# Patient Record
Sex: FEMALE | Race: WHITE | Employment: FULL TIME | URBAN - METROPOLITAN AREA
[De-identification: names, ages, dates, MRNs, and addresses within clinical notes are randomized per-mention and may not be internally consistent; named-entity substitution may affect disease eponyms.]

---

## 2017-04-13 ENCOUNTER — HOSPITAL ENCOUNTER (OUTPATIENT)
Dept: MAMMOGRAPHY | Age: 42
Discharge: HOME | End: 2017-04-13
Payer: COMMERCIAL

## 2017-04-13 DIAGNOSIS — Z12.31 VISIT FOR SCREENING MAMMOGRAM: ICD-10-CM

## 2017-04-13 PROCEDURE — 77067 SCR MAMMO BI INCL CAD: CPT

## 2017-10-27 RX ORDER — ATORVASTATIN CALCIUM 10 MG/1
10 TABLET, FILM COATED ORAL DAILY
COMMUNITY
End: 2019-05-23 | Stop reason: SDUPTHER

## 2017-10-27 RX ORDER — ESTRADIOL 0.04 MG/D
1 FILM, EXTENDED RELEASE TRANSDERMAL
COMMUNITY
End: 2018-10-04 | Stop reason: DRUGHIGH

## 2017-10-27 RX ORDER — CHOLECALCIFEROL TAB 125 MCG (5000 UNIT) 125 MCG
5000 TAB ORAL DAILY
COMMUNITY

## 2017-10-27 RX ORDER — POLYETHYLENE GLYCOL 3350 17 G/17G
17 POWDER, FOR SOLUTION ORAL DAILY
Status: ON HOLD | COMMUNITY
End: 2017-10-30

## 2017-10-27 RX ORDER — VITAMIN E 1000 UNIT
1000 CAPSULE ORAL DAILY
COMMUNITY

## 2017-10-27 RX ORDER — AZELASTINE 1 MG/ML
1 SPRAY, METERED NASAL ONCE
COMMUNITY
End: 2020-04-06 | Stop reason: SDUPTHER

## 2017-10-30 ENCOUNTER — ANESTHESIA (OUTPATIENT)
Dept: ENDOSCOPY | Age: 42
End: 2017-10-30
Payer: COMMERCIAL

## 2017-10-30 ENCOUNTER — ANESTHESIA EVENT (OUTPATIENT)
Dept: ENDOSCOPY | Age: 42
End: 2017-10-30
Payer: COMMERCIAL

## 2017-10-30 ENCOUNTER — HOSPITAL ENCOUNTER (OUTPATIENT)
Age: 42
Setting detail: OUTPATIENT SURGERY
Discharge: HOME | End: 2017-10-30
Attending: INTERNAL MEDICINE | Admitting: INTERNAL MEDICINE
Payer: COMMERCIAL

## 2017-10-30 VITALS
HEART RATE: 83 BPM | RESPIRATION RATE: 14 BRPM | OXYGEN SATURATION: 100 % | TEMPERATURE: 98.2 F | HEIGHT: 61 IN | SYSTOLIC BLOOD PRESSURE: 107 MMHG | BODY MASS INDEX: 30.46 KG/M2 | DIASTOLIC BLOOD PRESSURE: 79 MMHG | WEIGHT: 161.31 LBS

## 2017-10-30 LAB — HCG UR QL: NEGATIVE

## 2017-10-30 PROCEDURE — 74011250636 HC RX REV CODE- 250/636

## 2017-10-30 PROCEDURE — 76040000019: Performed by: INTERNAL MEDICINE

## 2017-10-30 PROCEDURE — 81025 URINE PREGNANCY TEST: CPT

## 2017-10-30 PROCEDURE — 76060000031 HC ANESTHESIA FIRST 0.5 HR: Performed by: INTERNAL MEDICINE

## 2017-10-30 PROCEDURE — 74011000250 HC RX REV CODE- 250

## 2017-10-30 PROCEDURE — 74011250636 HC RX REV CODE- 250/636: Performed by: INTERNAL MEDICINE

## 2017-10-30 RX ORDER — POLYETHYLENE GLYCOL 3350 17 G/17G
17 POWDER, FOR SOLUTION ORAL 2 TIMES DAILY
Qty: 1 EACH | Refills: 3 | Status: SHIPPED | OUTPATIENT
Start: 2017-10-30 | End: 2022-04-04

## 2017-10-30 RX ORDER — FLUMAZENIL 0.1 MG/ML
0.2 INJECTION INTRAVENOUS
Status: DISCONTINUED | OUTPATIENT
Start: 2017-10-30 | End: 2017-10-30 | Stop reason: HOSPADM

## 2017-10-30 RX ORDER — DEXTROMETHORPHAN/PSEUDOEPHED 2.5-7.5/.8
1.2 DROPS ORAL
Status: DISCONTINUED | OUTPATIENT
Start: 2017-10-30 | End: 2017-10-30 | Stop reason: HOSPADM

## 2017-10-30 RX ORDER — SODIUM CHLORIDE 9 MG/ML
25 INJECTION, SOLUTION INTRAVENOUS CONTINUOUS
Status: DISCONTINUED | OUTPATIENT
Start: 2017-10-30 | End: 2017-10-30 | Stop reason: HOSPADM

## 2017-10-30 RX ORDER — SODIUM CHLORIDE 0.9 % (FLUSH) 0.9 %
5-10 SYRINGE (ML) INJECTION AS NEEDED
Status: DISCONTINUED | OUTPATIENT
Start: 2017-10-30 | End: 2017-10-30 | Stop reason: HOSPADM

## 2017-10-30 RX ORDER — SODIUM CHLORIDE 0.9 % (FLUSH) 0.9 %
5-10 SYRINGE (ML) INJECTION EVERY 8 HOURS
Status: DISCONTINUED | OUTPATIENT
Start: 2017-10-30 | End: 2017-10-30 | Stop reason: HOSPADM

## 2017-10-30 RX ORDER — PROPOFOL 10 MG/ML
INJECTION, EMULSION INTRAVENOUS AS NEEDED
Status: DISCONTINUED | OUTPATIENT
Start: 2017-10-30 | End: 2017-10-30 | Stop reason: HOSPADM

## 2017-10-30 RX ORDER — NALOXONE HYDROCHLORIDE 0.4 MG/ML
0.4 INJECTION, SOLUTION INTRAMUSCULAR; INTRAVENOUS; SUBCUTANEOUS
Status: DISCONTINUED | OUTPATIENT
Start: 2017-10-30 | End: 2017-10-30 | Stop reason: HOSPADM

## 2017-10-30 RX ORDER — LIDOCAINE HYDROCHLORIDE 20 MG/ML
INJECTION, SOLUTION EPIDURAL; INFILTRATION; INTRACAUDAL; PERINEURAL AS NEEDED
Status: DISCONTINUED | OUTPATIENT
Start: 2017-10-30 | End: 2017-10-30 | Stop reason: HOSPADM

## 2017-10-30 RX ORDER — EPINEPHRINE 0.1 MG/ML
1 INJECTION INTRACARDIAC; INTRAVENOUS
Status: DISCONTINUED | OUTPATIENT
Start: 2017-10-30 | End: 2017-10-30 | Stop reason: HOSPADM

## 2017-10-30 RX ORDER — ATROPINE SULFATE 0.1 MG/ML
0.5 INJECTION INTRAVENOUS
Status: DISCONTINUED | OUTPATIENT
Start: 2017-10-30 | End: 2017-10-30 | Stop reason: HOSPADM

## 2017-10-30 RX ADMIN — SODIUM CHLORIDE 25 ML/HR: 900 INJECTION, SOLUTION INTRAVENOUS at 10:47

## 2017-10-30 RX ADMIN — LIDOCAINE HYDROCHLORIDE 20 MG: 20 INJECTION, SOLUTION EPIDURAL; INFILTRATION; INTRACAUDAL; PERINEURAL at 11:38

## 2017-10-30 RX ADMIN — PROPOFOL 300 MG: 10 INJECTION, EMULSION INTRAVENOUS at 11:48

## 2017-10-30 NOTE — ANESTHESIA PREPROCEDURE EVALUATION
Anesthetic History   No history of anesthetic complications            Review of Systems / Medical History  Patient summary reviewed, nursing notes reviewed and pertinent labs reviewed    Pulmonary            Asthma        Neuro/Psych   Within defined limits           Cardiovascular    Hypertension              Exercise tolerance: >4 METS     GI/Hepatic/Renal  Within defined limits              Endo/Other  Within defined limits           Other Findings            Physical Exam    Airway  Mallampati: II  TM Distance: 4 - 6 cm  Neck ROM: normal range of motion   Mouth opening: Normal     Cardiovascular  Regular rate and rhythm,  S1 and S2 normal,  no murmur, click, rub, or gallop             Dental  No notable dental hx       Pulmonary  Breath sounds clear to auscultation               Abdominal  GI exam deferred       Other Findings            Anesthetic Plan    ASA: 2  Anesthesia type: MAC            Anesthetic plan and risks discussed with: Patient      preg negative.

## 2017-10-30 NOTE — H&P
Gastroenterology Outpatient History and Physical    Patient: Vinh Ricardo    Physician: Ellis Packer MD    Chief Complaint: change in bowel habits    History of Present Illness: 41 yo F with abnormal bowel habits, recent changes, now very constipated, suddenly. No prior colonoscopy. History:  Past Medical History:   Diagnosis Date    Asthma     High cholesterol     Hypertension     MRSA (methicillin resistant staph aureus) culture positive       Past Surgical History:   Procedure Laterality Date    HX HEENT      wisdom teeth      Social History     Social History    Marital status:      Spouse name: N/A    Number of children: N/A    Years of education: N/A     Social History Main Topics    Smoking status: Never Smoker    Smokeless tobacco: Never Used    Alcohol use Yes      Comment: occasional     Drug use: No    Sexual activity: Not Asked     Other Topics Concern    None     Social History Narrative      Family History   Problem Relation Age of Onset    Hypertension Mother     Cancer Father     Hypertension Father     Asthma Sister     There is no problem list on file for this patient. Allergies: Allergies   Allergen Reactions    Bactrim [Sulfamethoprim] Rash     Medications:   Prior to Admission medications    Medication Sig Start Date End Date Taking? Authorizing Provider   azelastine (ASTELIN) 137 mcg (0.1 %) nasal spray 1 Spray once. Use in each nostril as directed   Yes Historical Provider   atorvastatin (LIPITOR) 10 mg tablet Take 10 mg by mouth daily. Indications: prevention of cerebrovascular accident   Yes Historical Provider   cholecalciferol, VITAMIN D3, (VITAMIN D3) 5,000 unit tab tablet Take 5,000 Units by mouth daily. Yes Historical Provider   polyethylene glycol (MIRALAX) 17 gram packet Take 17 g by mouth daily. Yes Historical Provider   ascorbic acid, vitamin C, (VITAMIN C) 1,000 mg tablet Take 1,000 mg by mouth.    Yes Historical Provider   METHYLDOPA (ALDOMET PO) Take 250 mg by mouth two (2) times a day. Yes Historical Provider   estradiol (VIVELLE) 0.0375 mg/24 hr 1 Patch by TransDERmal route two (2) days a week. Yes Historical Provider   fluticasone (FLONASE) 50 mcg/Actuation nasal spray 2 Sprays by Both Nostrils route nightly. Yes Phys Other, MD   montelukast (SINGULAIR) 10 mg tablet Take 10 mg by mouth nightly. 12/17/10  Yes Historical Provider     Physical Exam:   Vital Signs: Blood pressure 139/73, pulse (!) 104, temperature 98.1 °F (36.7 °C), resp. rate 20, height 5' 1\" (1.549 m), weight 73.2 kg (161 lb 5 oz), SpO2 100 %.   General: well developed, well nourished   HEENT: unremarkable   Heart: regular rhythm no mumur    Lungs: clear   Abdominal:  benign   Neurological: unremarkable   Extremities: no edema     Findings/Diagnosis: change in bowel habits    Plan of Care/Planned Procedure: colonoscopy with conscious/deep sedation    Signed:  Ra Madrid MD 10/30/2017

## 2017-10-30 NOTE — IP AVS SNAPSHOT
[address] [facility] 
[phone] Patient: Kat Alberto MRN: [id number] :1975 About your hospitalization You were admitted on:  2017 You last received care in the:  [facility] ENDOSCOPY You were discharged on:  2017 Why you were hospitalized Your primary diagnosis was:  Not on File Things You Need To Do (next 8 weeks) Follow up with Silvio Rushing MD  
  
Phone:  [phone] Where:  [address] [address], [address] [address] Discharge Orders None A check filiberto indicates which time of day the medication should be taken. My Medications TAKE these medications as instructed Instructions Each Dose to Equal  
 Morning Noon Evening Bedtime ALDOMET PO Your last dose was: Your next dose is: Take 250 mg by mouth two (2) times a day. 250 mg  
    
   
   
   
  
 atorvastatin 10 mg tablet Commonly known as:  LIPITOR Your last dose was: Your next dose is: Take 10 mg by mouth daily. Indications: prevention of cerebrovascular accident 10 mg  
    
   
   
   
  
 azelastine 137 mcg (0.1 %) nasal spray Commonly known as:  ASTELIN Your last dose was: Your next dose is:    
   
   
 1 Spray once. Use in each nostril as directed 1 Spray  
    
   
   
   
  
 cholecalciferol (VITAMIN D3) 5,000 unit Tab tablet Commonly known as:  VITAMIN D3 Your last dose was: Your next dose is: Take 5,000 Units by mouth daily. 5000 Units  
    
   
   
   
  
 estradiol 0.0375 mg/24 hr  
Commonly known as:  Balbir John Your last dose was: Your next dose is:    
   
   
 1 Patch by TransDERmal route two (2) days a week. 1 Patch FLONASE 50 mcg/actuation nasal spray Generic drug:  fluticasone Your last dose was: Your next dose is: 2 Sprays by Both Nostrils route nightly. 2 Spray  
    
   
   
   
  
 polyethylene glycol 17 gram packet Commonly known as:  Duane Pollock Your last dose was: Your next dose is: Take 1 Packet by mouth two (2) times a day. 17 g SINGULAIR 10 mg tablet Generic drug:  montelukast  
   
Your last dose was: Your next dose is: Take 10 mg by mouth nightly. 10 mg  
    
   
   
   
  
 VITAMIN C 1,000 mg tablet Generic drug:  ascorbic acid (vitamin C) Your last dose was: Your next dose is: Take 1,000 mg by mouth. 1000 mg Where to Get Your Medications Information on where to get these meds will be given to you by the nurse or doctor. ! Ask your nurse or doctor about these medications  
  polyethylene glycol 17 gram packet Discharge Instructions Mariannonel Perezdonis 
[id number] 1975 COLON DISCHARGE INSTRUCTIONS Discomfort: 
Redness at IV site- apply warm compress to area; if redness or soreness persist- contact your physician There may be a slight amount of blood passed from the rectum Gaseous discomfort- walking, belching will help relieve any discomfort You may not operate a vehicle for 12 hours You may not engage in an occupation involving machinery or appliances for rest of today You may not drink alcoholic beverages for at least 12 hours Avoid making any critical decisions for at least 24 hour DIET: 
 High fiber diet.  however -  remember your colon is empty and a heavy meal will produce gas. Avoid these foods:  vegetables, fried / greasy foods, carbonated drinks for today MEDICATION: 
(See attached) ACTIVITY: 
You may not resume your normal daily activities until tomorrow AM; it is recommended that you spend the remainder of the day resting -  avoid any strenuous activity. CALL M.D. ANY SIGN OF: Increasing pain, nausea, vomiting Abdominal distension (swelling) New increased bleeding (oral or rectal) Fever (chills) IMPRESSION: 
-- no polyps! ! 
-- we saw some hemorrhoids, which are very common, and these are swollen veins at the anal canal or end of the rectum, which can bulge with constipation and straining or frequent bowel movements. Sometimes they cause bleeding, burning, pressure, or even pain. A diet high in fiber helps, but using a daily fiber supplement (like 1-2 capfuls of miralax) can help treat these. -- it was a little twisty in the lower colon, which can make it harder to have bowel movements Follow-up Instructions: 
 Call Dr. Hilarie Alpers for the results of procedure / biopsy in 7-10 days Telephone # [phone] Repeat colonoscopy in 5 years Flor Spear MD  
 
 
[employer] Activation Thank you for requesting access to [employer]. Please follow the instructions below to securely access and download your online medical record. [employer] allows you to send messages to your doctor, view your test results, renew your prescriptions, schedule appointments, and more. How Do I Sign Up? 1. In your internet browser, go to [email or web address] 
2. Click on the First Time User? Click Here link in the Sign In box. You will be redirect to the New Member Sign Up page. 3. Enter your [employer] Access Code exactly as it appears below. You will not need to use this code after youve completed the sign-up process. If you do not sign up before the expiration date, you must request a new code. [employer] Access Code: Activation code not generated Current [employer] Status: Active (This is the date your [employer] access code will ) 4. Enter the last four digits of your Social Security Number (xxxx) and Date of Birth (mm/dd/yyyy) as indicated and click Submit. You will be taken to the next sign-up page. 5. Create a [employer] ID. This will be your [employer] login ID and cannot be changed, so think of one that is secure and easy to remember. 6. Create a [employer] password. You can change your password at any time. 7. Enter your Password Reset Question and Answer. This can be used at a later time if you forget your password. 8. Enter your e-mail address. You will receive e-mail notification when new information is available in [address] 9. Click Sign Up. You can now view and download portions of your medical record. 10. Click the Download Summary menu link to download a portable copy of your medical information. Additional Information If you have questions, please visit the Frequently Asked Questions section of the [employer] website at [email or web address] Remember, [employer] is NOT to be used for urgent needs. For medical emergencies, dial 911. Introducing [facility] SERVICES! Dear Rafal Alvarado: Thank you for requesting a [employer] account. Our records indicate that you already have an active [employer] account. You can access your account anytime at [email or web address] Did you know that you can access your hospital and ER discharge instructions at any time in [employer]? You can also review all of your test results from your hospital stay or ER visit. Additional Information If you have questions, please visit the Frequently Asked Questions section of the [employer] website at [email or web address] Remember, [employer] is NOT to be used for urgent needs. For medical emergencies, dial 911. Now available from your iPhone and Android! Providers Seen During Your Hospitalization Provider Specialty Primary office phone Tyron Schafer MD Gastroenterology [phone] Your Primary Care Physician (PCP) Primary Care Physician Office Phone Office Fax Mich Paulson [phone] You are allergic to the following Allergen Reactions Bactrim (Sulfamethoprim) Rash Recent Documentation Height Weight BMI OB Status Smoking Status 1.549 m 73.2 kg 30.48 kg/m2 IUD Never Smoker Emergency Contacts Name Discharge Info Relation Home Work Mobile Octaviano Dickinson DISCHARGE CAREGIVER [3] Spouse [3] [phone] Patient Belongings The following personal items are in your possession at time of discharge: 
  Dental Appliances: None  Visual Aid: Glasses Please provide this summary of care documentation to your next provider. Signatures-by signing, you are acknowledging that this After Visit Summary has been reviewed with you and you have received a copy. Patient Signature:  ____________________________________________________________ Date:  ____________________________________________________________  
  
[facility] Provider Signature:  ____________________________________________________________ Date:  ____________________________________________________________

## 2017-10-30 NOTE — DISCHARGE INSTRUCTIONS
Delmis Colón  [id number]  1975    COLON DISCHARGE INSTRUCTIONS  Discomfort:  Redness at IV site- apply warm compress to area; if redness or soreness persist- contact your physician  There may be a slight amount of blood passed from the rectum  Gaseous discomfort- walking, belching will help relieve any discomfort  You may not operate a vehicle for 12 hours  You may not engage in an occupation involving machinery or appliances for rest of today  You may not drink alcoholic beverages for at least 12 hours  Avoid making any critical decisions for at least 24 hour  DIET:   High fiber diet. - however -  remember your colon is empty and a heavy meal will produce gas. Avoid these foods:  vegetables, fried / greasy foods, carbonated drinks for today  MEDICATION:  (See attached)     ACTIVITY:  You may not resume your normal daily activities until tomorrow AM; it is recommended that you spend the remainder of the day resting -  avoid any strenuous activity. CALL M.D. ANY SIGN OF:   Increasing pain, nausea, vomiting  Abdominal distension (swelling)  New increased bleeding (oral or rectal)  Fever (chills)    IMPRESSION:  -- no polyps! !  -- we saw some hemorrhoids, which are very common, and these are swollen veins at the anal canal or end of the rectum, which can bulge with constipation and straining or frequent bowel movements. Sometimes they cause bleeding, burning, pressure, or even pain. A diet high in fiber helps, but using a daily fiber supplement (like 1-2 capfuls of miralax) can help treat these. -- it was a little twisty in the lower colon, which can make it harder to have bowel movements    Follow-up Instructions:   Call Dr. Jono Enciso for the results of procedure / biopsy in 7-10 days  Telephone # [phone]  Repeat colonoscopy in 5 years    Winifred Cranker, MD [facility] Activation    Thank you for requesting access to [address]  Please follow the instructions below to securely access and download your online medical record. [employer] allows you to send messages to your doctor, view your test results, renew your prescriptions, schedule appointments, and more. How Do I Sign Up? 1. In your internet browser, go to [email or web address]  2. Click on the First Time User? Click Here link in the Sign In box. You will be redirect to the New Member Sign Up page. 3. Enter your [employer] Access Code exactly as it appears below. You will not need to use this code after youve completed the sign-up process. If you do not sign up before the expiration date, you must request a new code. [employer] Access Code: Activation code not generated  Current [employer] Status: Active (This is the date your [employer] access code will )    4. Enter the last four digits of your Social Security Number (xxxx) and Date of Birth (mm/dd/yyyy) as indicated and click Submit. You will be taken to the next sign-up page. 5. Create a [employer] ID. This will be your [employer] login ID and cannot be changed, so think of one that is secure and easy to remember. 6. Create a [employer] password. You can change your password at any time. 7. Enter your Password Reset Question and Answer. This can be used at a later time if you forget your password. 8. Enter your e-mail address. You will receive e-mail notification when new information is available in [address] 9. Click Sign Up. You can now view and download portions of your medical record. 10. Click the Download Summary menu link to download a portable copy of your medical information. Additional Information    If you have questions, please visit the Frequently Asked Questions section of the [employer] website at [email or web address] Remember, [employer] is NOT to be used for urgent needs. For medical emergencies, dial 911.

## 2017-10-30 NOTE — PERIOP NOTES
Caity Pod  1975  [id number]    Situation:  Verbal report received from: FAWAD Stephens RN  Procedure: Procedure(s):  COLONOSCOPY    Background:    Preoperative diagnosis: CHANGE IN BOWEL FUNCTION AND CHRONIC IDIOPATHIC CONSTIPATION  Postoperative diagnosis: Hemorrhoids    :  Dr. Anders Oar  Assistant(s): Endoscopy Technician-1: Matt Gramajo  Endoscopy RN-1: Jennifer Nieves RN    Specimens: * No specimens in log *  H. Pylori  no    Assessment:    Anesthesia gave intra-procedure sedation and medications, see anesthesia flow sheet yes    Intravenous fluids: NS@ KVO     Vital signs stable     Abdominal assessment: round and soft     Recommendation:  Discharge patient per MD order.   Family   Permission to share finding with family or friend yes

## 2017-10-30 NOTE — ANESTHESIA POSTPROCEDURE EVALUATION
Post-Anesthesia Evaluation and Assessment    Patient: Delmis Colón MRN: [id number]  SSN: [id number]    YOB: 1975  Age: 40 y.o. Sex: female       Cardiovascular Function/Vital Signs  Visit Vitals    /79    Pulse 83    Temp 36.8 °C (98.2 °F)    Resp 14    Ht 5' 1\" (1.549 m)    Wt 73.2 kg (161 lb 5 oz)    SpO2 100%    BMI 30.48 kg/m2       Patient is status post MAC anesthesia for Procedure(s):  COLONOSCOPY. Nausea/Vomiting: None    Postoperative hydration reviewed and adequate. Pain:  Pain Scale 1: Numeric (0 - 10) (10/30/17 1209)  Pain Intensity 1: 0 (10/30/17 1209)   Managed    Neurological Status: At baseline    Mental Status and Level of Consciousness: Arousable    Pulmonary Status:   O2 Device: Room air (10/30/17 1209)   Adequate oxygenation and airway patent    Complications related to anesthesia: None    Post-anesthesia assessment completed.  No concerns    Signed By: Reynaldo Lockwood MD     October 30, 2017

## 2017-10-30 NOTE — PERIOP NOTES
Anesthesia reports 300mg Propofol, 20mg Lidocaine and 250mL NS given during procedure. Received report from anesthesia staff on vital signs and status of patient.

## 2017-10-30 NOTE — PERIOP NOTES
Endoscope was pre-cleaned at the bedside immediately following procedure by [facility] AND [facility].

## 2017-10-30 NOTE — PROCEDURES
NAME:  Enid Whitman   :   1975   MRN:   [id number]     Date/Time:  10/30/2017 11:36 AM    Colonoscopy Operative Report    Procedure Type:  Colonoscopy --diagnostic     Indications: change in bowel habits  Pre-operative Diagnosis: see indication above  Post-operative Diagnosis:  See findings below  :  Roxi Lane MD  Referring Provider: -Ghada Sy MD    Exam:  Airway: clear, no airway problems anticipated  Heart: RRR, without gallops or rubs  Lungs: clear bilaterally without wheezes, crackles, or rhonchi  Abdomen: soft, nontender, nondistended, bowel sounds present  Mental Status: awake, alert and oriented to person, place and time    Sedation:  MAC anesthesia Propofol  Procedure Details:  After informed consent was obtained with all risks and benefits of procedure explained and preoperative exam completed, the patient was taken to the endoscopy suite and placed in the left lateral decubitus position. Upon sequential sedation as per above, a digital rectal exam was performed demonstrating internal and external hemorrhoids. The Olympus videocolonoscope  was inserted in the rectum and carefully advanced to the terminal ileum. The quality of preparation was good. The colonoscope was slowly withdrawn with careful evaluation between folds. Retroflexion in the rectum was completed demonstrating internal and external hemorrhoids. Findings:   ANUS: Anal exam reveals no masses but hemorrhoids, sphincter tone is normal.   RECTUM: Rectal exam reveals no masses but moderate internal and external hemorrhoids. SIGMOID COLON: The mucosa is normal with good vascular pattern and without ulcers, diverticula, and polyps. DESCENDING COLON: The mucosa is normal with good vascular pattern and without ulcers, diverticula, and polyps. TRANSVERSE COLON: The mucosa is normal with good vascular pattern and without ulcers, diverticula, and polyps.    ASCENDING COLON: The mucosa is normal with good vascular pattern and without ulcers, diverticula, and polyps. CECUM: The appendiceal orifice appears normal. The ileocecal valve appears normal.   TERMINAL ILEUM: The terminal ileum was entered and normal    Specimen Removed:  none  Complications:  None. EBL:     None. Impression:    -- internal and external hemorrhoids  -- otherwise, normal mucosa with some noted sigmoid tortuosity. Recommendations:   -- high fiber diet  -- repeat colonoscopy at age 49  -- follow up in the office in 3-4 weeks    Discharge Disposition:  Home in the company of a  when able to ambulate.       Tyron Schafer MD

## 2018-05-24 ENCOUNTER — HOSPITAL ENCOUNTER (OUTPATIENT)
Dept: MAMMOGRAPHY | Age: 43
Discharge: HOME | End: 2018-05-24
Payer: COMMERCIAL

## 2018-05-24 DIAGNOSIS — Z12.31 ENCOUNTER FOR SCREENING MAMMOGRAM FOR MALIGNANT NEOPLASM OF BREAST: ICD-10-CM

## 2018-05-24 PROCEDURE — 77067 SCR MAMMO BI INCL CAD: CPT

## 2018-09-23 ENCOUNTER — OFFICE VISIT (OUTPATIENT)
Dept: URGENT CARE | Age: 43
End: 2018-09-23

## 2018-09-23 VITALS
WEIGHT: 172 LBS | SYSTOLIC BLOOD PRESSURE: 134 MMHG | TEMPERATURE: 99.4 F | HEART RATE: 96 BPM | OXYGEN SATURATION: 99 % | BODY MASS INDEX: 33.77 KG/M2 | DIASTOLIC BLOOD PRESSURE: 75 MMHG | HEIGHT: 60 IN | RESPIRATION RATE: 16 BRPM

## 2018-09-23 DIAGNOSIS — B96.89 ACUTE BACTERIAL SINUSITIS: Primary | ICD-10-CM

## 2018-09-23 DIAGNOSIS — J01.90 ACUTE BACTERIAL SINUSITIS: Primary | ICD-10-CM

## 2018-09-23 DIAGNOSIS — H57.89 EYE DISCHARGE: ICD-10-CM

## 2018-09-23 RX ORDER — LOSARTAN POTASSIUM 50 MG/1
50 TABLET ORAL DAILY
COMMUNITY
End: 2019-05-23 | Stop reason: SDUPTHER

## 2018-09-23 RX ORDER — AMOXICILLIN AND CLAVULANATE POTASSIUM 875; 125 MG/1; MG/1
1 TABLET, FILM COATED ORAL EVERY 12 HOURS
Qty: 14 TAB | Refills: 0 | Status: SHIPPED | OUTPATIENT
Start: 2018-09-23 | End: 2018-09-30

## 2018-09-23 RX ORDER — LORATADINE 10 MG/1
10 TABLET ORAL
COMMUNITY
End: 2022-04-20 | Stop reason: ALTCHOICE

## 2018-09-23 NOTE — PROGRESS NOTES
HPI Comments:     2 day history of left eye discharge. Feels a little swollen and sore right in corner  Thinks may be a tear duct problem  Some occasional drainage from eye clear to cloudy. No redness, swelling, fever, or chills, nausea or vomiting. No pain with eye movement. Has been using warm compresses with minimal relief. Denies any other associated symptoms. Feels well otherwise. Just saw allergist last week. Hx significant for MRSA, high cholesterol, asthma, HTN    Patient is a 44 y.o. female presenting with eye pain. Eye Pain          Past Medical History:   Diagnosis Date    Asthma     High cholesterol     Hypertension     MRSA (methicillin resistant staph aureus) culture positive         Past Surgical History:   Procedure Laterality Date    COLONOSCOPY N/A 10/30/2017    COLONOSCOPY performed by Kaylie Sanches [facility] at [facility]  10/30/2017         HX HEENT      wisdom teeth         Family History   Problem Relation Age of Onset    Hypertension Mother     Cancer Father     Hypertension Father     Asthma Sister         Social History     Social History    Marital status: UNKNOWN     Spouse name: N/A    Number of children: N/A    Years of education: N/A     Occupational History    Not on file. Social History Main Topics    Smoking status: Never Smoker    Smokeless tobacco: Never Used    Alcohol use Yes      Comment: occasional     Drug use: No    Sexual activity: Not on file     Other Topics Concern    Not on file     Social History Narrative                ALLERGIES: Bactrim [sulfamethoprim]    Review of Systems   HENT: Positive for sinus pain. Eyes: Positive for pain. Negative for redness and visual disturbance. Neurological: Negative for dizziness and light-headedness. All other systems reviewed and are negative.       Vitals:    09/23/18 1759   BP: 134/75   Pulse: 96   Resp: 16   Temp: 99.4 °F (37.4 °C)   SpO2: 99%   Weight: 172 lb (78 kg)   Height: 5' (1.524 m)       Physical Exam   Constitutional: She is oriented to person, place, and time. No distress. Non-toxic appearing, well hydrated   HENT:   Head:       Swollen pale nasal turbinates with yellowish green mucus in nasal passages bilat. Maxillary sinus TTP. OP cobblestoning otherwise clear and moist. TMs normal.   Eyes: Conjunctivae and EOM are normal. Pupils are equal, round, and reactive to light. No scleral icterus. Sclera non injected. No periorbital erythema or swelling. No discharge. No pain with EOM movements. No pain to eye with closed eye palpation. No chemosis. Neck: Normal range of motion. Neck supple. Cardiovascular: Normal rate, regular rhythm and normal heart sounds. Exam reveals no gallop and no friction rub. No murmur heard. Pulmonary/Chest: Effort normal and breath sounds normal. No respiratory distress. She has no wheezes. She has no rales. Lymphadenopathy:     She has no cervical adenopathy. Neurological: She is alert and oriented to person, place, and time. No cranial nerve deficit. Skin: Skin is warm and dry. No rash noted. She is not diaphoretic. No erythema. No pallor. Psychiatric: She has a normal mood and affect. Her behavior is normal. Thought content normal.   Nursing note and vitals reviewed. MDM     Differential Diagnosis; Clinical Impression; Plan:       CLINICAL IMPRESSION:  (J01.90,  B96.89) Acute bacterial sinusitis  (primary encounter diagnosis)  (H57.8) Eye discharge    Orders Placed This Encounter  RX      amoxicillin-clavulanate (AUGMENTIN) 875-125 mg per tablet    Suspect and most likely bacterial sinus infection. DDX  preseptal cellulitis, dacrocystitis. Do not suspect orbital cellulitis. Does not appear to be conjunctivitis. Plan:  1. Will start on Augmentin  2. Warm moist compresses  3. Follow up with PCP in 4-5 days for re evaluation  4. Advised immediate follow up for new, worsening or changes.       We have reviewed concerning signs/symptoms, normal vs abnormal progression of medical condition and when to seek immediate medical attention.             Procedures

## 2018-09-23 NOTE — PATIENT INSTRUCTIONS
I am advising that you follow up with your Primary Care Doctor in 4-5 days for re evaluation; follow up sooner/immediately for new, worsening or changes      Sinusitis: Care Instructions  Your Care Instructions    Sinusitis is an infection of the lining of the sinus cavities in your head. Sinusitis often follows a cold. It causes pain and pressure in your head and face. In most cases, sinusitis gets better on its own in 1 to 2 weeks. But some mild symptoms may last for several weeks. Sometimes antibiotics are needed. Follow-up care is a key part of your treatment and safety. Be sure to make and go to all appointments, and call your doctor if you are having problems. It's also a good idea to know your test results and keep a list of the medicines you take. How can you care for yourself at home? · Take an over-the-counter pain medicine, such as acetaminophen (Tylenol), ibuprofen (Advil, Motrin), or naproxen (Aleve). Read and follow all instructions on the label. · If the doctor prescribed antibiotics, take them as directed. Do not stop taking them just because you feel better. You need to take the full course of antibiotics. · Be careful when taking over-the-counter cold or flu medicines and Tylenol at the same time. Many of these medicines have acetaminophen, which is Tylenol. Read the labels to make sure that you are not taking more than the recommended dose. Too much acetaminophen (Tylenol) can be harmful. · Breathe warm, moist air from a steamy shower, a hot bath, or a sink filled with hot water. Avoid cold, dry air. Using a humidifier in your home may help. Follow the directions for cleaning the machine. · Use saline (saltwater) nasal washes to help keep your nasal passages open and wash out mucus and bacteria. You can buy saline nose drops at a grocery store or drugstore. Or you can make your own at home by adding 1 teaspoon of salt and 1 teaspoon of baking soda to 2 cups of distilled water.  If you make your own, fill a bulb syringe with the solution, insert the tip into your nostril, and squeeze gently. Perry Bees your nose. · Put a hot, wet towel or a warm gel pack on your face 3 or 4 times a day for 5 to 10 minutes each time. · Try a decongestant nasal spray like oxymetazoline (Afrin). Do not use it for more than 3 days in a row. Using it for more than 3 days can make your congestion worse. When should you call for help? Call your doctor now or seek immediate medical care if:    · You have new or worse swelling or redness in your face or around your eyes.     · You have a new or higher fever.    Watch closely for changes in your health, and be sure to contact your doctor if:    · You have new or worse facial pain.     · The mucus from your nose becomes thicker (like pus) or has new blood in it.     · You are not getting better as expected. Where can you learn more? Go to http://linda-po.info/. Enter N695 in the search box to learn more about \"Sinusitis: Care Instructions. \"  Current as of: May 12, 2017  Content Version: 11.7  © 5941-9425 [employer]. Care instructions adapted under license by [employer] (which disclaims liability or warranty for this information). If you have questions about a medical condition or this instruction, always ask your healthcare professional. [place] [address] any warranty or liability for your use of this information.

## 2018-09-23 NOTE — MR AVS SNAPSHOT
[address] [place] [place] [address] 
[phone] Patient: Tess Ramirez MRN: [id number] :1975 Visit Information Date & Time Provider Department Dept. Phone Encounter #  
 2018  5:45 PM [address] [facility] [phone] [id number] Your Appointments 10/4/2018  9:00 AM  
New Patient with Vertis Fabry, MD [place] ([address]) Appt Note: NG/[employer] [address] [facility] [address] [facility] [id number]  
[facility] 31 [address] Upcoming Health Maintenance Date Due DTaP/Tdap/Td series (1 - Tdap) 1997 PAP AKA CERVICAL CYTOLOGY 1997 Influenza Age 5 to Adult 2018 Allergies as of 2018  Review Complete On: 2018 By: Raj Steward RN Severity Noted Reaction Type Reactions Bactrim [Sulfamethoprim]  2010   Side Effect Rash Current Immunizations  Never Reviewed No immunizations on file. Not reviewed this visit You Were Diagnosed With   
  
 Codes Comments Acute bacterial sinusitis    -  Primary ICD-10-CM: J01.90, B96.89 
ICD-9-CM: 461.9 Eye discharge     ICD-10-CM: H57.8 ICD-9-CM: 379.93 Vitals BP Pulse Temp Resp Height(growth percentile) Weight(growth percentile) 134/75 96 99.4 °F (37.4 °C) 16 5' (1.524 m) 172 lb (78 kg) SpO2 BMI OB Status Smoking Status 99% 33.59 kg/m2 IUD Never Smoker BMI and BSA Data Body Mass Index Body Surface Area  
 33.59 kg/m 2 1.82 m 2 Your Updated Medication List  
  
   
This list is accurate as of 18  6:21 PM.  Always use your most recent med list.  
  
  
  
  
 amoxicillin-clavulanate 875-125 mg per tablet Commonly known as:  AUGMENTIN Take 1 Tab by mouth every twelve (12) hours for 7 days. atorvastatin 10 mg tablet Commonly known as:  LIPITOR Take 10 mg by mouth daily. Indications: prevention of cerebrovascular accident  
  
 azelastine 137 mcg (0.1 %) nasal spray Commonly known as:  ASTELIN  
1 Spray once. Use in each nostril as directed  
  
 cholecalciferol (VITAMIN D3) 5,000 unit Tab tablet Commonly known as:  VITAMIN D3 Take 5,000 Units by mouth daily. CLARITIN 10 mg tablet Generic drug:  loratadine Take 10 mg by mouth.  
  
 estradiol 0.0375 mg/24 hr  
Commonly known as:  VIVELLE  
1 Patch by TransDERmal route two (2) days a week. FLONASE 50 mcg/actuation nasal spray Generic drug:  fluticasone 2 Sprays by Both Nostrils route nightly. losartan 50 mg tablet Commonly known as:  COZAAR Take 50 mg by mouth daily. polyethylene glycol 17 gram packet Commonly known as:  Danisha Mt Take 1 Packet by mouth two (2) times a day. SINGULAIR 10 mg tablet Generic drug:  montelukast  
Take 10 mg by mouth nightly. VITAMIN C 1,000 mg tablet Generic drug:  ascorbic acid (vitamin C) Take 1,000 mg by mouth. Prescriptions Printed Refills  
 amoxicillin-clavulanate (AUGMENTIN) 875-125 mg per tablet 0 Sig: Take 1 Tab by mouth every twelve (12) hours for 7 days. Class: Print Route: Oral  
  
Patient Instructions I am advising that you follow up with your Primary Care Doctor in 4-5 days for re evaluation; follow up sooner/immediately for new, worsening or changes Sinusitis: Care Instructions Your Care Instructions Sinusitis is an infection of the lining of the sinus cavities in your head. Sinusitis often follows a cold. It causes pain and pressure in your head and face. In most cases, sinusitis gets better on its own in 1 to 2 weeks. But some mild symptoms may last for several weeks. Sometimes antibiotics are needed. Follow-up care is a key part of your treatment and safety.  Be sure to make and go to all appointments, and call your doctor if you are having problems. It's also a good idea to know your test results and keep a list of the medicines you take. How can you care for yourself at home? · Take an over-the-counter pain medicine, such as acetaminophen (Tylenol), ibuprofen (Advil, Motrin), or naproxen (Aleve). Read and follow all instructions on the label. · If the doctor prescribed antibiotics, take them as directed. Do not stop taking them just because you feel better. You need to take the full course of antibiotics. · Be careful when taking over-the-counter cold or flu medicines and Tylenol at the same time. Many of these medicines have acetaminophen, which is Tylenol. Read the labels to make sure that you are not taking more than the recommended dose. Too much acetaminophen (Tylenol) can be harmful. · Breathe warm, moist air from a steamy shower, a hot bath, or a sink filled with hot water. Avoid cold, dry air. Using a humidifier in your home may help. Follow the directions for cleaning the machine. · Use saline (saltwater) nasal washes to help keep your nasal passages open and wash out mucus and bacteria. You can buy saline nose drops at a grocery store or drugstore. Or you can make your own at home by adding 1 teaspoon of salt and 1 teaspoon of baking soda to 2 cups of distilled water. If you make your own, fill a bulb syringe with the solution, insert the tip into your nostril, and squeeze gently. Marleta Miramontes your nose. · Put a hot, wet towel or a warm gel pack on your face 3 or 4 times a day for 5 to 10 minutes each time. · Try a decongestant nasal spray like oxymetazoline (Afrin). Do not use it for more than 3 days in a row. Using it for more than 3 days can make your congestion worse. When should you call for help? Call your doctor now or seek immediate medical care if: 
  · You have new or worse swelling or redness in your face or around your eyes.  
  · You have a new or higher fever.  Watch closely for changes in your health, and be sure to contact your doctor if: 
  · You have new or worse facial pain.  
  · The mucus from your nose becomes thicker (like pus) or has new blood in it.  
  · You are not getting better as expected. Where can you learn more? Go to http://linda-po.info/. Enter W612 in the search box to learn more about \"Sinusitis: Care Instructions. \" Current as of: May 12, 2017 Content Version: 11.7 © 3855-3936 [employer]. Care instructions adapted under license by [employer] (which disclaims liability or warranty for this information). If you have questions about a medical condition or this instruction, always ask your healthcare professional. [address] any warranty or liability for your use of this information. Introducing [facility]! Dear Isaura Sterling: Thank you for requesting a [employer] account. Our records indicate that you already have an active [employer] account. You can access your account anytime at [email or web address] Did you know that you can access your hospital and ER discharge instructions at any time in [employer]? You can also review all of your test results from your hospital stay or ER visit. Additional Information If you have questions, please visit the Frequently Asked Questions section of the [employer] website at [email or web address] Remember, [employer] is NOT to be used for urgent needs. For medical emergencies, dial 911. Now available from your iPhone and Android! Please provide this summary of care documentation to your next provider. Your primary care clinician is listed as Cedric Slater. If you have any questions after today's visit, please call [phone].

## 2018-10-04 ENCOUNTER — OFFICE VISIT (OUTPATIENT)
Dept: OBGYN CLINIC | Age: 43
End: 2018-10-04

## 2018-10-04 VITALS
HEIGHT: 60 IN | WEIGHT: 165.4 LBS | DIASTOLIC BLOOD PRESSURE: 97 MMHG | BODY MASS INDEX: 32.47 KG/M2 | SYSTOLIC BLOOD PRESSURE: 131 MMHG | HEART RATE: 89 BPM

## 2018-10-04 DIAGNOSIS — Z30.431 SURVEILLANCE OF (INTRAUTERINE) CONTRACEPTIVE DEVICE: ICD-10-CM

## 2018-10-04 DIAGNOSIS — Z01.419 WELL WOMAN EXAM WITH ROUTINE GYNECOLOGICAL EXAM: Primary | ICD-10-CM

## 2018-10-04 DIAGNOSIS — N89.8 VAGINA ITCHING: ICD-10-CM

## 2018-10-04 DIAGNOSIS — N95.1 MENOPAUSAL SYNDROME (HOT FLASHES): ICD-10-CM

## 2018-10-04 RX ORDER — ESTRADIOL 0.05 MG/D
1 FILM, EXTENDED RELEASE TRANSDERMAL
Qty: 24 PATCH | Refills: 4 | Status: SHIPPED | OUTPATIENT
Start: 2018-10-04 | End: 2019-10-10 | Stop reason: SDUPTHER

## 2018-10-04 RX ORDER — FLUCONAZOLE 150 MG/1
150 TABLET ORAL DAILY
Qty: 1 TAB | Refills: 0 | Status: SHIPPED | OUTPATIENT
Start: 2018-10-04 | End: 2018-10-05

## 2018-10-04 NOTE — MR AVS SNAPSHOT
[address] [place] [address] [address] [facility] 
[phone] Patient: Amaya Santa MRN: [id number] :1975 Visit Information Date & Time Provider Department Dept. Phone Encounter #  
 10/4/2018  9:00  [address] [facility] [phone] [id number] Upcoming Health Maintenance Date Due  
 PAP AKA CERVICAL CYTOLOGY 1997 Influenza Age 5 to Adult 2018 Allergies as of 10/4/2018  Review Complete On: 10/4/2018 By: Janiya Lopez LPN Severity Noted Reaction Type Reactions Bactrim [Sulfamethoprim]  2010   Side Effect Rash Current Immunizations  Never Reviewed No immunizations on file. Not reviewed this visit You Were Diagnosed With   
  
 Codes Comments Well woman exam with routine gynecological exam    -  Primary ICD-10-CM: H94.691 ICD-9-CM: V72.31 Vitals BP Pulse Height(growth percentile) Weight(growth percentile) LMP BMI  
 (!) 131/97 89 5' (1.524 m) 165 lb 6.4 oz (75 kg) 2017 32.3 kg/m2 OB Status Smoking Status IUD Never Smoker BMI and BSA Data Body Mass Index Body Surface Area  
 32.3 kg/m 2 1.78 m 2 Your Updated Medication List  
  
   
This list is accurate as of 10/4/18  9:20 AM.  Always use your most recent med list.  
  
  
  
  
 atorvastatin 10 mg tablet Commonly known as:  LIPITOR Take 10 mg by mouth daily. Indications: prevention of cerebrovascular accident  
  
 azelastine 137 mcg (0.1 %) nasal spray Commonly known as:  ASTELIN  
1 Spray once. Use in each nostril as directed  
  
 cholecalciferol (VITAMIN D3) 5,000 unit Tab tablet Commonly known as:  VITAMIN D3 Take 5,000 Units by mouth daily. CLARITIN 10 mg tablet Generic drug:  loratadine Take 10 mg by mouth.  
  
 estradiol 0.0375 mg/24 hr  
Commonly known as:  Michelle Aguilar  
 1 Patch by TransDERmal route two (2) days a week. FLONASE 50 mcg/actuation nasal spray Generic drug:  fluticasone 2 Sprays by Both Nostrils route nightly. losartan 50 mg tablet Commonly known as:  COZAAR Take 50 mg by mouth daily. polyethylene glycol 17 gram packet Commonly known as:  Tyler Trent Take 1 Packet by mouth two (2) times a day. SINGULAIR 10 mg tablet Generic drug:  montelukast  
Take 10 mg by mouth nightly. VITAMIN C 1,000 mg tablet Generic drug:  ascorbic acid (vitamin C) Take 1,000 mg by mouth. We Performed the Following PAP IG, HPV AND RFX HPV 86/40,78(185425) [[id number] Custom] Patient Instructions Well Visit, Ages 25 to 48: Care Instructions Your Care Instructions Physical exams can help you stay healthy. Your doctor has checked your overall health and may have suggested ways to take good care of yourself. He or she also may have recommended tests. At home, you can help prevent illness with healthy eating, regular exercise, and other steps. Follow-up care is a key part of your treatment and safety. Be sure to make and go to all appointments, and call your doctor if you are having problems. It's also a good idea to know your test results and keep a list of the medicines you take. How can you care for yourself at home? · Reach and stay at a healthy weight. This will lower your risk for many problems, such as obesity, diabetes, heart disease, and high blood pressure. · Get at least 30 minutes of physical activity on most days of the week. Walking is a good choice. You also may want to do other activities, such as running, swimming, cycling, or playing tennis or team sports. Discuss any changes in your exercise program with your doctor. · Do not smoke or allow others to smoke around you. If you need help quitting, talk to your doctor about stop-smoking programs and medicines. These can increase your chances of quitting for good. · Talk to your doctor about whether you have any risk factors for sexually transmitted infections (STIs). Having one sex partner (who does not have STIs and does not have sex with anyone else) is a good way to avoid these infections. · Use birth control if you do not want to have children at this time. Talk with your doctor about the choices available and what might be best for you. · Protect your skin from too much sun. When you're outdoors from 10 a.m. to 4 p.m., stay in the shade or cover up with clothing and a hat with a wide brim. Wear sunglasses that block UV rays. Even when it's cloudy, put broad-spectrum sunscreen (SPF 30 or higher) on any exposed skin. · See a dentist one or two times a year for checkups and to have your teeth cleaned. · Wear a seat belt in the car. · Drink alcohol in moderation, if at all. That means no more than 2 drinks a day for men and 1 drink a day for women. Follow your doctor's advice about when to have certain tests. These tests can spot problems early. For everyone · Cholesterol. Have the fat (cholesterol) in your blood tested after age 21. Your doctor will tell you how often to have this done based on your age, family history, or other things that can increase your risk for heart disease. · Blood pressure. Have your blood pressure checked during a routine doctor visit. Your doctor will tell you how often to check your blood pressure based on your age, your blood pressure results, and other factors. · Vision. Talk with your doctor about how often to have a glaucoma test. 
· Diabetes. Ask your doctor whether you should have tests for diabetes. · Colon cancer. Have a test for colon cancer at age 48. You may have one of several tests. If you are younger than 48, you may need a test earlier if you have any risk factors.  Risk factors include whether you already had a precancerous polyp removed from your colon or whether your parent, brother, sister, or child has had colon cancer. For women · Breast exam and mammogram. Talk to your doctor about when you should have a clinical breast exam and a mammogram. Medical experts differ on whether and how often women under 50 should have these tests. Your doctor can help you decide what is right for you. · Pap test and pelvic exam. Begin Pap tests at age 24. A Pap test is the best way to find cervical cancer. The test often is part of a pelvic exam. Ask how often to have this test. 
· Tests for sexually transmitted infections (STIs). Ask whether you should have tests for STIs. You may be at risk if you have sex with more than one person, especially if your partners do not wear condoms. For men · Tests for sexually transmitted infections (STIs). Ask whether you should have tests for STIs. You may be at risk if you have sex with more than one person, especially if you do not wear a condom. · Testicular cancer exam. Ask your doctor whether you should check your testicles regularly. · Prostate exam. Talk to your doctor about whether you should have a blood test (called a PSA test) for prostate cancer. Experts differ on whether and when men should have this test. Some experts suggest it if you are older than 39 and are -American or have a father or brother who got prostate cancer when he was younger than 72. When should you call for help? Watch closely for changes in your health, and be sure to contact your doctor if you have any problems or symptoms that concern you. Where can you learn more? Go to http://linda-po.info/. Enter P072 in the search box to learn more about \"Well Visit, Ages 25 to 48: Care Instructions. \" Current as of: March 29, 2018 Content Version: 11.8 © 5214-5429 Healthwise, Incorporated.  Care instructions adapted under license by [address] (which disclaims liability or warranty for this information). If you have questions about a medical condition or this instruction, always ask your healthcare professional. [address] any warranty or liability for your use of this information. Introducing [facility]! Dear Georgette Salazar: Thank you for requesting a [employer] account. Our records indicate that you already have an active [employer] account. You can access your account anytime at [email or web address] Did you know that you can access your hospital and ER discharge instructions at any time in [employer]? You can also review all of your test results from your hospital stay or ER visit. Additional Information If you have questions, please visit the Frequently Asked Questions section of the [employer] website at [email or web address] Remember, [employer] is NOT to be used for urgent needs. For medical emergencies, dial 911. Now available from your iPhone and Android! Please provide this summary of care documentation to your next provider. Your primary care clinician is listed as Cassius Cardozo. If you have any questions after today's visit, please call [phone].

## 2018-10-04 NOTE — PROGRESS NOTES
Annual exam ages 40-58 
(new patient) Dutch Stack is a [de-identified] ,  44 y.o. female [facility] Patient's last menstrual period was 03/01/2017., who presents for her annual checkup. Prev GYN care with Dr. Alice Estevez. Pt works at [employer] in [facility]. Wants to move her care into the [employer] system. Since her last annual GYN exam about one year ago,  she has the following changes in her health history: none. Brings old records in today, reviewed. Pap/HPV neg (5/11/16) Premature menopause. On Estradiol patch 0.0375 and has IUD for endometrial protection. Knows IUD is good for 5yrs, but not sure if it is Fredoniat[place] or [place]. Unclear from records (no actual procedure note, code states Mirena IUD, previous progress note states pt most interested in [facility]). She thinks she still has card at home, will check. Still having vasomotor sx on current estradiol patch. Saw fertility specialist in 2013. \"FSH was out of wack\", told eugenieal (although was still having cycles). ADDITIONAL CONCERNS: 
She is having some mild vaginal itching. Recently completed course of abx. Concerned about yeast. 
 
With regard to the Gardasil vaccine, she is older than the age for which it is FDA approved. Menstrual status: 
 
Her periods are absent in flow. She denies dysmenorrhea. She denies premenstrual symptoms. Contraception: The current method of family planning is IUD. Sexual history: 
 
 reports that she currently engages in sexual activity and has had male partners. She reports using the following method of birth control/protection: IUD. Pap and Mammogram History: 
 
Her most recent Pap smear was normal, HPV was neg, obtained 1 year(s) ago. The patient had a recent mammogram 5/24/18 which was negative for malignancy. Osteoporosis History: 
 
Family history does not include a first or second degree relative with osteopenia or osteoporosis. She is currently taking calcium and vit D. Past Medical History:  
Diagnosis Date  Asthma  High cholesterol  Hx of mammogram 2018 Negative. No mammographic evidence of malignancy  Hypertension  IUD (intrauterine device) in place 2017 MIrena. Placed for endometrial protection with HRT.  MRSA (methicillin resistant staph aureus) culture positive   
 vulvar  Premature menopause  Routine Papanicolaou smear 2016  
 pap/HPV neg (media tab) Past Surgical History:  
Procedure Laterality Date  COLONOSCOPY N/A 10/30/2017 COLON[facility]COPY [facility] by [facility]ain MD at hospitals ENDOSCOPY  COLONOSCOPY,DIAGNOSTIC  10/30/2017  HX HEENT    
 wisdom teeth OB History  Para Term  AB Living  
0 0 0 0 0 0 SAB TAB Ectopic Molar Multiple Live Births  
0 0 0 0 0 0 Current Outpatient Prescriptions Medication Sig Dispense Refill  estradiol (VIVELLE) 0.05 mg/24 hr 1 Patch by TransDERmal route two (2) days a week. 24 Patch 4  
 fluconazole (DIFLUCAN) 150 mg tablet Take 1 Tab by mouth daily for 1 day. FDA advises cautious prescribing of oral fluconazole in pregnancy. 1 Tab 0  
 losartan (COZAAR) 50 mg tablet Take 50 mg by mouth daily.  loratadine (CLARITIN) 10 mg tablet Take 10 mg by mouth.  polyethylene glycol (MIRALAX) 17 gram packet Take 1 Packet by mouth two (2) times a day. (Patient taking differently: Take 17 g by mouth daily. ) 1 Each 3  
 azelastine (ASTELIN) 137 mcg (0.1 %) nasal spray 1 Spray once. Use in each nostril as directed  atorvastatin (LIPITOR) 10 mg tablet Take 10 mg by mouth daily. Indications: prevention of cerebrovascular accident  cholecalciferol, VITAMIN D3, (VITAMIN D3) 5,000 unit tab tablet Take 5,000 Units by mouth daily.  ascorbic acid, vitamin C, (VITAMIN C) 1,000 mg tablet Take 1,000 mg by mouth.     
 fluticasone (FLONASE) 50 mcg/Actuation nasal spray 2 Sprays by Both Nostrils route nightly.  montelukast (SINGULAIR) 10 mg tablet Take 10 mg by mouth nightly. Allergies: Bactrim [sulfamethoprim] Social History Social History  Marital status:  Spouse name: N/A  
 Number of children: N/A  
 Years of education: N/A Occupational History  Not on file. Social History Main Topics  Smoking status: Never Smoker  Smokeless tobacco: Never Used  Alcohol use Yes Comment: occasional   
 Drug use: No  
 Sexual activity: Yes  
  Partners: Male Birth control/ protection: IUD Other Topics Concern  Not on file Social History Narrative Tobacco History:  reports that she has never smoked. She has never used smokeless tobacco. 
Alcohol Abuse:  reports that she drinks alcohol. Drug Abuse:  reports that she does not use illicit drugs. There is no problem list on file for this patient. Family History Problem Relation Age of Onset  Hypertension Mother  Cancer Father  Hypertension Father  Asthma Sister Review of Systems - History obtained from the patient Constitutional: negative for weight loss, fever, night sweats HEENT: negative for hearing loss, earache, congestion, snoring, sorethroat CV: negative for chest pain, palpitations, edema Resp: negative for cough, shortness of breath, wheezing GI: negative for change in bowel habits, abdominal pain, black or bloody stools : negative for frequency, dysuria, hematuria MSK: negative for back pain, joint pain, muscle pain Breast: negative for breast lumps, nipple discharge, galactorrhea Skin :negative for itching, rash, hives Neuro: negative for dizziness, headache, confusion, weakness Psych: negative for anxiety, depression, change in mood Heme/lymph: negative for bleeding, bruising, pallor Physical Exam 
 
Visit Vitals  BP (!) 131/97  Pulse 89  
 Ht 5' (1.524 m)  Wt 165 lb 6.4 oz (75 kg)  LMP 03/01/2017  BMI 32.3 kg/m2 Constitutional 
· Appearance: well-nourished, well developed, alert, in no acute distress HENT 
· Head and Face: appears normal 
 
Neck · Inspection/Palpation: normal appearance, no masses or tenderness · Lymph Nodes: no lymphadenopathy present · Thyroid: gland size normal, nontender, no nodules or masses present on palpation Chest 
· Respiratory Effort: breathing unlabored · Auscultation: normal breath sounds Cardiovascular · Heart: 
· Auscultation: regular rate and rhythm without murmur Breasts · Inspection of Breasts: breasts symmetrical, no skin changes, no discharge present, nipple appearance normal, no skin retraction present · Palpation of Breasts and Axillae: no masses present on palpation, no breast tenderness · Axillary Lymph Nodes: no lymphadenopathy present Gastrointestinal 
· Abdominal Examination: abdomen non-tender to palpation, normal bowel sounds, no masses present · Liver and spleen: no hepatomegaly present, spleen not palpable · Hernias: no hernias identified Genitourinary · External Genitalia: normal appearance for age, no discharge present, no tenderness present, no inflammatory lesions present, no masses present, no atrophy present · Vagina: normal vaginal vault without central or paravaginal defects, no discharge present, no inflammatory lesions present, no masses present · Bladder: non-tender to palpation · Urethra: appears normal 
· Cervix: normal; IUD strings nl length ~3-3.5cm, appear to be [employer], not Mirena · Uterus: normal size, shape and consistency · Adnexa: no adnexal tenderness present, no adnexal masses present · Perineum: perineum within normal limits, no evidence of trauma, no rashes or skin lesions present · Anus: anus within normal limits, no hemorrhoids present · Inguinal Lymph Nodes: no lymphadenopathy present Skin · General Inspection: no rash, no lesions identified Neurologic/Psychiatric · Mental Status: · Orientation: grossly oriented to person, place and time · Mood and Affect: mood normal, affect appropriate Assessment & Plan: · Routine gynecologic examination. Pap/HPV neg (2016) -> rpt 5yrs · Menopausal.  Has IUD (suspect Kyleena, possibly Mirena). Vivelle patch, still having sx. Will increase to 0.05mg eRX #24 RFx4 · C/o vaginal itching after recent course abx. No significant discharge on exam.  eRX Diflucan. · Her current medical status is satisfactory with no evidence of significant gynecologic issues. · Counseled re: diet, exercise, healthy lifestyle · Return for yearly wellness visits · Rec annual mammogram 
· Patient verbalized understanding Orders Placed This Encounter  estradiol (VIVELLE) 0.05 mg/24 hr  
  Si Patch by TransDERmal route two (2) days a week. Dispense:  24 Patch Refill:  4  
 fluconazole (DIFLUCAN) 150 mg tablet Sig: Take 1 Tab by mouth daily for 1 day. FDA advises cautious prescribing of oral fluconazole in pregnancy. Dispense:  1 Tab   Refill:  0

## 2019-05-23 ENCOUNTER — OFFICE VISIT (OUTPATIENT)
Dept: INTERNAL MEDICINE CLINIC | Age: 44
End: 2019-05-23

## 2019-05-23 VITALS
RESPIRATION RATE: 16 BRPM | WEIGHT: 155.4 LBS | OXYGEN SATURATION: 100 % | HEART RATE: 86 BPM | TEMPERATURE: 97.5 F | DIASTOLIC BLOOD PRESSURE: 67 MMHG | SYSTOLIC BLOOD PRESSURE: 97 MMHG | HEIGHT: 61 IN | BODY MASS INDEX: 29.34 KG/M2

## 2019-05-23 DIAGNOSIS — Z00.00 ROUTINE MEDICAL EXAM: Primary | ICD-10-CM

## 2019-05-23 DIAGNOSIS — Z91.09 ENVIRONMENTAL ALLERGIES: ICD-10-CM

## 2019-05-23 DIAGNOSIS — I10 ESSENTIAL HYPERTENSION: ICD-10-CM

## 2019-05-23 DIAGNOSIS — E55.9 VITAMIN D DEFICIENCY: ICD-10-CM

## 2019-05-23 DIAGNOSIS — E78.2 MIXED HYPERLIPIDEMIA: ICD-10-CM

## 2019-05-23 RX ORDER — LOSARTAN POTASSIUM 50 MG/1
50 TABLET ORAL DAILY
Qty: 90 TAB | Refills: 3 | Status: SHIPPED | OUTPATIENT
Start: 2019-05-23 | End: 2020-03-30 | Stop reason: SDUPTHER

## 2019-05-23 RX ORDER — MONTELUKAST SODIUM 10 MG/1
10 TABLET ORAL
Qty: 90 TAB | Refills: 3 | Status: SHIPPED | OUTPATIENT
Start: 2019-05-23 | End: 2020-03-30 | Stop reason: SDUPTHER

## 2019-05-23 RX ORDER — ATORVASTATIN CALCIUM 10 MG/1
10 TABLET, FILM COATED ORAL DAILY
Qty: 90 TAB | Refills: 3 | Status: SHIPPED | OUTPATIENT
Start: 2019-05-23 | End: 2020-03-30 | Stop reason: SDUPTHER

## 2019-05-23 NOTE — PROGRESS NOTES
Reviewed record in preparation for visit and have obtained necessary documentation. Identified pt with two pt identifiers(name and ). Chief Complaint   Patient presents with   [facility] Establish Care    Medication Evaluation     Pt fasting    Cholesterol Problem       Health Maintenance Due   Topic Date Due    DTaP/Tdap/Td  (1 - Tdap) 1997       Ms. Dread Mims has a reminder for a \"due or due soon\" health maintenance. I have asked that she discuss this further with her primary care provider for follow-up on this health maintenance. Coordination of Care Questionnaire:  :     1) Have you been to an emergency room, urgent care clinic since your last visit? no   Hospitalized since your last visit? no             2) Have you seen or consulted any other health care providers outside of [address] [place] [address] since your last visit? no  (Include any pap smears or colon screenings in this section.)    3) In the event something were to happen to you and you were unable to speak on your behalf, do you have an Advance Directive/ Living Will in place stating your wishes? NO    Do you have an Advance Directive on file? no    4) Are you interested in receiving information on Advance Directives? NO    Patient is accompanied by self I have received verbal consent from Mery Diego to discuss any/all medical information while they are present in the room.

## 2019-05-23 NOTE — PATIENT INSTRUCTIONS
Body Mass Index: Care Instructions Your Care Instructions Body mass index (BMI) can help you see if your weight is raising your risk for health problems. It uses a formula to compare how much you weigh with how tall you are. · A BMI lower than 18.5 is considered underweight. · A BMI between 18.5 and 24.9 is considered healthy. · A BMI between 25 and 29.9 is considered overweight. A BMI of 30 or higher is considered obese. If your BMI is in the normal range, it means that you have a lower risk for weight-related health problems. If your BMI is in the overweight or obese range, you may be at increased risk for weight-related health problems, such as high blood pressure, heart disease, stroke, arthritis or joint pain, and diabetes. If your BMI is in the underweight range, you may be at increased risk for health problems such as fatigue, lower protection (immunity) against illness, muscle loss, bone loss, hair loss, and hormone problems. BMI is just one measure of your risk for weight-related health problems. You may be at higher risk for health problems if you are not active, you eat an unhealthy diet, or you drink too much alcohol or use tobacco products. Follow-up care is a key part of your treatment and safety. Be sure to make and go to all appointments, and call your doctor if you are having problems. It's also a good idea to know your test results and keep a list of the medicines you take. How can you care for yourself at home? · Practice healthy eating habits. This includes eating plenty of fruits, vegetables, whole grains, lean protein, and low-fat dairy. · If your doctor recommends it, get more exercise. Walking is a good choice. Bit by bit, increase the amount you walk every day. Try for at least 30 minutes on most days of the week. · Do not smoke. Smoking can increase your risk for health problems.  If you need help quitting, talk to your doctor about stop-smoking programs and medicines. These can increase your chances of quitting for good. · Limit alcohol to 2 drinks a day for men and 1 drink a day for women. Too much alcohol can cause health problems. If you have a BMI higher than 25 · Your doctor may do other tests to check your risk for weight-related health problems. This may include measuring the distance around your waist. A waist measurement of more than 40 inches in men or 35 inches in women can increase the risk of weight-related health problems. · Talk with your doctor about steps you can take to stay healthy or improve your health. You may need to make lifestyle changes to lose weight and stay healthy, such as changing your diet and getting regular exercise. If you have a BMI lower than 18.5 · Your doctor may do other tests to check your risk for health problems. · Talk with your doctor about steps you can take to stay healthy or improve your health. You may need to make lifestyle changes to gain or maintain weight and stay healthy, such as getting more healthy foods in your diet and doing exercises to build muscle. Where can you learn more? Go to http://linda-po.info/. Enter S176 in the search box to learn more about \"Body Mass Index: Care Instructions. \" Current as of: October 13, 2016 Content Version: 11.4 © 8394-7944 [employer]. Care instructions adapted under license by "[employer] (which disclaims liability or warranty for this information). If you have questions about a medical condition or this instruction, always ask your healthcare professional. [address] any warranty or liability for your use of this information.

## 2019-05-23 NOTE — PROGRESS NOTES
HPI: Magnus Drew is a 44 y.o. female presents to establish. Treated for HTN. Losartan 50mg once a day. Both parents with HTN. BP controlled. No dizziness, headaches, or visual changes. Up to date on eye exam.  Active at home, walking 2-3 days a week. Working on weight loss. Changed diet and increased exercise, lost 12# in the past year. Environmental allergies. On singulair, claritin, astelin, and flonase. On immunotherapy for many years,  Sees Dr. Caden Mack, allergy. History of asthma. No albuterol. On statin for 20's. No myalgias. Last labs in January. Sees gyn, Dr. Lady Wright. Normal pap. Has IUD, Berenice Asper. No menses. normal urination. Prior colonoscopy in 2017. Dr. Geovanny Interiano. For constipation. Given miralax, once a day. Up to date on eye and dental.      Has dogs, lives with . 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 Vitamin D 5k daily. ROS:  Constitutional: negative for fevers, chills, anorexia, weight loss  Eyes:   negative for visual disturbance, irritation  ENT:   negative for tinnitus,sore throat,nasal congestion,ear pain,  Respiratory:  negative for cough, hemoptysis, dyspnea,wheezing  CV:   negative for chest pain, palpitations, lower extremity edema  GI:   negative for nausea, vomiting, diarrhea, abdominal pain,melena  Endo:               negative for polyuria,polydipsia,polyphagia,heat intolerance  Genitourinary: negative for frequency, dysuria, hematuria  Integument:  negative for rash, pruritus  Hematologic:  negative for easy bruising and gum/nose bleeding  Musculoskel: negative for myalgias, back pain, muscle weakness, joint pain  Neurological:  negative for headaches, dizziness, gait problems, numbness  Behavl/Psych: negative for feelings of anxiety, depression, mood changes    Past Medical History:   Diagnosis Date    Asthma     Environmental allergies     High cholesterol     Hx of mammogram 05/24/2018    Negative.  No mammographic evidence of malignancy    Hypertension     IUD (intrauterine device) in place 03/16/2017    [facility]. Placed for endometrial protection with HRT.  MRSA (methicillin resistant staph aureus) culture positive 2010    vulvar    Premature menopause     Routine Papanicolaou smear 05/11/2016    pap/HPV neg (media tab)     Past Surgical History:   Procedure Laterality Date    COLONOSCOPY N/A 10/30/2017    COL[facility] at [facility] ENDOSCOPY    COLONOSCOPY,DIAGNOSTIC  10/30/2017         HX COLONOSCOPY      HX HEENT      wisdom teeth     Social History     Socioeconomic History    Marital status:      Spouse name: Not on file    Number of children: Not on file    Years of education: Not on file    Highest education level: Not on file   Tobacco Use    Smoking status: Never Smoker    Smokeless tobacco: Never Used   Substance and Sexual Activity    Alcohol use: Yes     Alcohol/week: 0.6 oz     Types: 1 Glasses of wine per week     Frequency: 2-4 times a month     Drinks per session: 1 or 2     Binge frequency: Weekly     Comment: occasional     Drug use: No    Sexual activity: Yes     Partners: Male     Birth control/protection: IUD     Family History   Problem Relation Age of Onset    Hypertension Mother     Cancer Father         NHL.  Hypertension Father     Asthma Sister      Current Outpatient Medications   Medication Sig Dispense Refill    atorvastatin (LIPITOR) 10 mg tablet Take 1 Tab by mouth daily. Indications: stroke prevention 90 Tab 3    losartan (COZAAR) 50 mg tablet Take 1 Tab by mouth daily. 90 Tab 3    montelukast (SINGULAIR) 10 mg tablet Take 1 Tab by mouth nightly. 90 Tab 3    levonorgestrel (MIRENA) 20 mcg/24 hours (5 yrs) 52 mg IUD 1 Device by IntraUTERine route once.  estradiol (VIVELLE) 0.05 mg/24 hr 1 Patch by TransDERmal route two (2) days a week. 24 Patch 4    polyethylene glycol (MIRALAX) 17 gram packet Take 1 Packet by mouth two (2) times a day. (Patient taking differently: Take 17 g by mouth daily. ) 1 Each 3    azelastine (ASTELIN) 137 mcg (0.1 %) nasal spray 1 Spray once. Use in each nostril as directed      cholecalciferol, VITAMIN D3, (VITAMIN D3) 5,000 unit tab tablet Take 5,000 Units by mouth daily.  ascorbic acid, vitamin C, (VITAMIN C) 1,000 mg tablet Take 1,000 mg by mouth.  fluticasone (FLONASE) 50 mcg/Actuation nasal spray 2 Sprays by Both Nostrils route nightly.  loratadine (CLARITIN) 10 mg tablet Take 10 mg by mouth. Allergies   Allergen Reactions    Bactrim [Sulfamethoprim] Rash         Physical exam:  Visit Vitals  BP 97/67 (BP 1 Location: Left arm, BP Patient Position: Sitting)   Pulse 86   Temp 97.5 °F (36.4 °C) (Oral)   Resp 16   Ht 5' 1\" (1.549 m)   Wt 155 lb 6.4 oz (70.5 kg)   SpO2 100%   BMI 29.36 kg/m²     General appearance - alert, well appearing, and in no distress  HEENT- PERLL,normal conjunctiva, TM normal bilaterally, hearing grossly normal, normal nasal turbinates, no sinus tenderness, mucous membranes moist, pharynx normal without lesions  Neck - supple, no significant adenopathy   Pulm- clear to auscultation, no wheezes, rales or rhonchi  CV- normal rate, regular rhythm, normal S1, S2, no murmurs   Abdomen - soft, nontender, nondistended, no masses or organomegaly  Extrem-peripheral pulses normal, no pedal edema  Neuro -alert, oriented,nonfocal        Assessment/Plan:      ICD-10-CM ICD-9-CM    1. Routine medical exam E77.87 F12.9 METABOLIC PANEL, COMPREHENSIVE      CBC W/O DIFF      LIPID PANEL      HEMOGLOBIN A1C W/O EAG      VITAMIN D, 25 HYDROXY      TSH 3RD GENERATION      URINALYSIS W/ RFLX MICROSCOPIC   2. Mixed hyperlipidemia E78.2 272.2 atorvastatin (LIPITOR) 10 mg tablet      METABOLIC PANEL, COMPREHENSIVE      LIPID PANEL   3.  Essential hypertension I10 401.9 losartan (COZAAR) 50 mg tablet      METABOLIC PANEL, COMPREHENSIVE      CBC W/O DIFF      TSH 3RD GENERATION      URINALYSIS W/ RFLX MICROSCOPIC   4. Vitamin D deficiency E55.9 268.9 VITAMIN D, 25 HYDROXY   5. BMI 29.0-29.9,adult Z68.29 V85.25 TSH 3RD GENERATION   6. Environmental allergies Z91.09 V15.09 montelukast (SINGULAIR) 10 mg tablet   Recommend heart healthy diet and regular cardiovascular exercise. Follow-up and Dispositions    · Return for follow up pending labs and annual.  .         Michoacano Wetzel MD      Discussed the patient's BMI with her. The BMI follow up plan is as follows:     dietary management education, guidance, and counseling  encourage exercise  monitor weight  prescribed dietary intake    An After Visit Summary was printed and given to the patient.

## 2019-05-24 LAB
25(OH)D3+25(OH)D2 SERPL-MCNC: 62.9 NG/ML (ref 30–100)
ALBUMIN SERPL-MCNC: 4.7 G/DL (ref 3.5–5.5)
ALBUMIN/GLOB SERPL: 2.2 {RATIO} (ref 1.2–2.2)
ALP SERPL-CCNC: 64 IU/L (ref 39–117)
ALT SERPL-CCNC: 46 IU/L (ref 0–32)
APPEARANCE UR: CLEAR
AST SERPL-CCNC: 20 IU/L (ref 0–40)
BACTERIA #/AREA URNS HPF: NORMAL /[HPF]
BILIRUB SERPL-MCNC: 1.2 MG/DL (ref 0–1.2)
BILIRUB UR QL STRIP: NEGATIVE
BUN SERPL-MCNC: 12 MG/DL (ref 6–24)
BUN/CREAT SERPL: 17 (ref 9–23)
CALCIUM SERPL-MCNC: 9.5 MG/DL (ref 8.7–10.2)
CASTS URNS QL MICRO: NORMAL /LPF
CHLORIDE SERPL-SCNC: 103 MMOL/L (ref 96–106)
CHOLEST SERPL-MCNC: 161 MG/DL (ref 100–199)
CO2 SERPL-SCNC: 22 MMOL/L (ref 20–29)
COLOR UR: YELLOW
CREAT SERPL-MCNC: 0.71 MG/DL (ref 0.57–1)
EPI CELLS #/AREA URNS HPF: NORMAL /HPF (ref 0–10)
ERYTHROCYTE [DISTWIDTH] IN BLOOD BY AUTOMATED COUNT: 13.1 % (ref 12.3–15.4)
GLOBULIN SER CALC-MCNC: 2.1 G/DL (ref 1.5–4.5)
GLUCOSE SERPL-MCNC: 77 MG/DL (ref 65–99)
GLUCOSE UR QL: NEGATIVE
HBA1C MFR BLD: 5 % (ref 4.8–5.6)
HCT VFR BLD AUTO: 44 % (ref 34–46.6)
HDLC SERPL-MCNC: 37 MG/DL
HGB BLD-MCNC: 14.3 G/DL (ref 11.1–15.9)
HGB UR QL STRIP: NEGATIVE
KETONES UR QL STRIP: NEGATIVE
LDLC SERPL CALC-MCNC: 98 MG/DL (ref 0–99)
LEUKOCYTE ESTERASE UR QL STRIP: ABNORMAL
MCH RBC QN AUTO: 29.7 PG (ref 26.6–33)
MCHC RBC AUTO-ENTMCNC: 32.5 G/DL (ref 31.5–35.7)
MCV RBC AUTO: 92 FL (ref 79–97)
MICRO URNS: ABNORMAL
MUCOUS THREADS URNS QL MICRO: PRESENT
NITRITE UR QL STRIP: NEGATIVE
PH UR STRIP: 7 [PH] (ref 5–7.5)
PLATELET # BLD AUTO: 233 X10E3/UL (ref 150–450)
POTASSIUM SERPL-SCNC: 4.2 MMOL/L (ref 3.5–5.2)
PROT SERPL-MCNC: 6.8 G/DL (ref 6–8.5)
PROT UR QL STRIP: NEGATIVE
RBC # BLD AUTO: 4.81 X10E6/UL (ref 3.77–5.28)
RBC #/AREA URNS HPF: NORMAL /HPF (ref 0–2)
SODIUM SERPL-SCNC: 143 MMOL/L (ref 134–144)
SP GR UR: 1.01 (ref 1–1.03)
TRIGL SERPL-MCNC: 130 MG/DL (ref 0–149)
TSH SERPL DL<=0.005 MIU/L-ACNC: 1.5 UIU/ML (ref 0.45–4.5)
UROBILINOGEN UR STRIP-MCNC: 0.2 MG/DL (ref 0.2–1)
VLDLC SERPL CALC-MCNC: 26 MG/DL (ref 5–40)
WBC # BLD AUTO: 5.2 X10E3/UL (ref 3.4–10.8)
WBC #/AREA URNS HPF: NORMAL /HPF (ref 0–5)

## 2019-10-09 NOTE — PATIENT INSTRUCTIONS
Well Visit, Ages 25 to 48: Care Instructions  Your Care Instructions    Physical exams can help you stay healthy. Your doctor has checked your overall health and may have suggested ways to take good care of yourself. He or she also may have recommended tests. At home, you can help prevent illness with healthy eating, regular exercise, and other steps. Follow-up care is a key part of your treatment and safety. Be sure to make and go to all appointments, and call your doctor if you are having problems. It's also a good idea to know your test results and keep a list of the medicines you take. How can you care for yourself at home? · Reach and stay at a healthy weight. This will lower your risk for many problems, such as obesity, diabetes, heart disease, and high blood pressure. · Get at least 30 minutes of physical activity on most days of the week. Walking is a good choice. You also may want to do other activities, such as running, swimming, cycling, or playing tennis or team sports. Discuss any changes in your exercise program with your doctor. · Do not smoke or allow others to smoke around you. If you need help quitting, talk to your doctor about stop-smoking programs and medicines. These can increase your chances of quitting for good. · Talk to your doctor about whether you have any risk factors for sexually transmitted infections (STIs). Having one sex partner (who does not have STIs and does not have sex with anyone else) is a good way to avoid these infections. · Use birth control if you do not want to have children at this time. Talk with your doctor about the choices available and what might be best for you. · Protect your skin from too much sun. When you're outdoors from 10 a.m. to 4 p.m., stay in the shade or cover up with clothing and a hat with a wide brim. Wear sunglasses that block UV rays. Even when it's cloudy, put broad-spectrum sunscreen (SPF 30 or higher) on any exposed skin.   · See a dentist one or two times a year for checkups and to have your teeth cleaned. · Wear a seat belt in the car. Follow your doctor's advice about when to have certain tests. These tests can spot problems early. For everyone  · Cholesterol. Have the fat (cholesterol) in your blood tested after age 21. Your doctor will tell you how often to have this done based on your age, family history, or other things that can increase your risk for heart disease. · Blood pressure. Have your blood pressure checked during a routine doctor visit. Your doctor will tell you how often to check your blood pressure based on your age, your blood pressure results, and other factors. · Vision. Talk with your doctor about how often to have a glaucoma test.  · Diabetes. Ask your doctor whether you should have tests for diabetes. · Colon cancer. Your risk for colorectal cancer gets higher as you get older. Some experts say that adults should start regular screening at age 48 and stop at age 76. Others say to start before age 48 or continue after age 76. Talk with your doctor about your risk and when to start and stop screening. For women  · Breast exam and mammogram. Talk to your doctor about when you should have a clinical breast exam and a mammogram. Medical experts differ on whether and how often women under 50 should have these tests. Your doctor can help you decide what is right for you. · Cervical cancer screening test and pelvic exam. Begin with a Pap test at age 24. The test often is part of a pelvic exam. Starting at age 27, you may choose to have a Pap test, an HPV test, or both tests at the same time (called co-testing). Talk with your doctor about how often to have testing. · Tests for sexually transmitted infections (STIs). Ask whether you should have tests for STIs. You may be at risk if you have sex with more than one person, especially if your partners do not wear condoms.   For men  · Tests for sexually transmitted infections (STIs). Ask whether you should have tests for STIs. You may be at risk if you have sex with more than one person, especially if you do not wear a condom. · Testicular cancer exam. Ask your doctor whether you should check your testicles regularly. · Prostate exam. Talk to your doctor about whether you should have a blood test (called a PSA test) for prostate cancer. Experts differ on whether and when men should have this test. Some experts suggest it if you are older than 39 and are -American or have a father or brother who got prostate cancer when he was younger than 72. When should you call for help? Watch closely for changes in your health, and be sure to contact your doctor if you have any problems or symptoms that concern you. Where can you learn more? Go to http://linda-po.info/. Enter P072 in the search box to learn more about \"Well Visit, Ages 25 to 48: Care Instructions. \"  Current as of: December 13, 2018  Content Version: 12.2  © 1700-1089 [employer]. Care instructions adapted under license by [employer] (which disclaims liability or warranty for this information). If you have questions about a medical condition or this instruction, always ask your healthcare professional. [place] [address] any warranty or liability for your use of this information.

## 2019-10-09 NOTE — PROGRESS NOTES
Annual exam ages 40-58    [facility] is a ,  38 y.o. female [facility] No LMP recorded. (Menstrual status: IUD). , who presents for her annual checkup. Since her last annual GYN exam about one year ago on ,  she has the following changes in her health history: none. Premature menopause. On Estradiol patch 0.05 and has IUD for endometrial protection. ADDITIONAL CONCERNS:  She is having vaginal itching x 1 wk. Denies odor or d/c. Using \"AZO for yeast\", not sure what is in it, has noticed some improvement. States frequent yeast infections. Had nl hgbA1C in May. With regard to the Gardasil vaccine, she is older than the age for which it is FDA approved. Menstrual status:    Her periods are absent in flow. She is using essentially none pads or tampons per day, minimal to none using Mirena. She denies dysmenorrhea. She denies premenstrual symptoms. Contraception:    The current method of family planning is IUD. Sexual history:     reports that she currently engages in sexual activity and has had partner(s) who are Male. She reports using the following method of birth control/protection: IUD. Pap and Mammogram History:    Her most recent Pap smear was normal, HPV was negative, obtained on 16. She does not have a history of abnormal pap smears. The patient had her mammogram today in our office. Osteoporosis History:    Family history does not include a first or second degree relative with osteopenia or osteoporosis. Past Medical History:   Diagnosis Date    Asthma     Environmental allergies     High cholesterol     Hx of mammogram 2018    Negative. No mammographic evidence of malignancy    Hypertension     IUD (intrauterine device) in place 2017    [facility]. Placed for endometrial protection with HRT.     MRSA (methicillin resistant staph aureus) culture positive     vulvar    Premature menopause     Routine Papanicolaou smear 2016    Pap/HPV neg (media tab)     Past Surgical History:   Procedure Laterality Date    COLONOSCOPY N/A 10/30/2017    COL[facility]um [facility] at [facility] ENDOSCOPY    COLONOSCOPY,DIAGNOSTIC  10/30/2017         HX COLONOSCOPY      HX HEENT      wisdom teeth     OB History    Para Term  AB Living   0 0 0 0 0 0   SAB TAB Ectopic Molar Multiple Live Births   0 0 0 0 0 0       Current Outpatient Medications   Medication Sig Dispense Refill    phenazopyridine HCl (AZO PO) Take  by mouth.  estradiol (VIVELLE) 0.05 mg/24 hr 1 Patch by TransDERmal route two (2) days a week. 24 Patch 4    atorvastatin (LIPITOR) 10 mg tablet Take 1 Tab by mouth daily. Indications: stroke prevention 90 Tab 3    losartan (COZAAR) 50 mg tablet Take 1 Tab by mouth daily. 90 Tab 3    montelukast (SINGULAIR) 10 mg tablet Take 1 Tab by mouth nightly. 90 Tab 3    levonorgestrel (MIRENA) 20 mcg/24 hours (5 yrs) 52 mg IUD 1 Device by IntraUTERine route once.  loratadine (CLARITIN) 10 mg tablet Take 10 mg by mouth.  polyethylene glycol (MIRALAX) 17 gram packet Take 1 Packet by mouth two (2) times a day. (Patient taking differently: Take 17 g by mouth daily. ) 1 Each 3    azelastine (ASTELIN) 137 mcg (0.1 %) nasal spray 1 Spray once. Use in each nostril as directed      cholecalciferol, VITAMIN D3, (VITAMIN D3) 5,000 unit tab tablet Take 5,000 Units by mouth daily.  ascorbic acid, vitamin C, (VITAMIN C) 1,000 mg tablet Take 1,000 mg by mouth.  fluticasone (FLONASE) 50 mcg/Actuation nasal spray 2 Sprays by Both Nostrils route nightly.        Allergies: Bactrim [sulfamethoprim]   Social History     Socioeconomic History    Marital status:      Spouse name: Not on file    Number of children: Not on file    Years of education: Not on file    Highest education level: Not on file   Occupational History    Not on file   Social Needs    Financial resource strain: Not on file    Food insecurity:     Worry: Not on file     Inability: Not on file    Transportation needs:     Medical: Not on file     Non-medical: Not on file   Tobacco Use    Smoking status: Never Smoker    Smokeless tobacco: Never Used   Substance and Sexual Activity    Alcohol use: Yes     Alcohol/week: 1.0 standard drinks     Types: 1 Glasses of wine per week     Frequency: 2-4 times a month     Drinks per session: 1 or 2     Binge frequency: Weekly     Comment: occasional     Drug use: No    Sexual activity: Yes     Partners: Male     Birth control/protection: IUD   Lifestyle    Physical activity:     Days per week: Not on file     Minutes per session: Not on file    Stress: Not on file   Relationships    Social connections:     Talks on phone: Not on file     Gets together: Not on file     Attends Yarsani service: Not on file     Active member of club or organization: Not on file     Attends meetings of clubs or organizations: Not on file     Relationship status: Not on file    Intimate partner violence:     Fear of current or ex partner: Not on file     Emotionally abused: Not on file     Physically abused: Not on file     Forced sexual activity: Not on file   Other Topics Concern    Not on file   Social History Narrative    Not on file     Tobacco History:  reports that she has never smoked. She has never used smokeless tobacco.  Alcohol Abuse:  reports that she drinks about 1.0 standard drinks of alcohol per week. Drug Abuse:  reports that she does not use drugs. There is no problem list on file for this patient. Family History   Problem Relation Age of Onset    Hypertension Mother     Cancer Father         NHL.      Hypertension Father     Asthma Sister        Review of Systems - History obtained from the patient  Constitutional: negative for weight loss, fever, night sweats  HEENT: negative for hearing loss, earache, congestion, snoring, sorethroat  CV: negative for chest pain, palpitations, edema  Resp: negative for cough, shortness of breath, wheezing  GI: negative for change in bowel habits, abdominal pain, black or bloody stools  : negative for frequency, dysuria, hematuria, vaginal discharge  MSK: negative for back pain, joint pain, muscle pain  Breast: negative for breast lumps, nipple discharge, galactorrhea  Skin :negative for itching, rash, hives  Neuro: negative for dizziness, headache, confusion, weakness  Psych: negative for anxiety, depression, change in mood  Heme/lymph: negative for bleeding, bruising, pallor    Physical Exam    Visit Vitals  /73 (BP 1 Location: Right arm, BP Patient Position: Sitting)   Pulse 85   Ht 5' 0.5\" (1.537 m)   Wt 151 lb (68.5 kg)   BMI 29.00 kg/m²       Constitutional  · Appearance: well-nourished, well developed, alert, in no acute distress    HENT  · Head and Face: appears normal    Neck  · Inspection/Palpation: normal appearance, no masses or tenderness  · Lymph Nodes: no lymphadenopathy present  · Thyroid: gland size normal, nontender, no nodules or masses present on palpation    Chest  · Respiratory Effort: breathing unlabored  · Auscultation: normal breath sounds    Cardiovascular  · Heart:  · Auscultation: regular rate and rhythm without murmur    Breasts  · Inspection of Breasts: breasts symmetrical, no skin changes, no discharge present, nipple appearance normal, no skin retraction present  · Palpation of Breasts and Axillae: no masses present on palpation, no breast tenderness  · Axillary Lymph Nodes: no lymphadenopathy present    Gastrointestinal  · Abdominal Examination: abdomen non-tender to palpation, normal bowel sounds, no masses present  · Liver and spleen: no hepatomegaly present, spleen not palpable  · Hernias: no hernias identified    Genitourinary  · External Genitalia: normal appearance for age, no discharge present, no tenderness present, no inflammatory lesions present, no masses present, no atrophy present  · Vagina: normal vaginal vault without central or paravaginal defects, scant white discharge present, no inflammatory lesions present, no masses present  · Bladder: non-tender to palpation  · Urethra: appears normal  · Cervix: normal; IUD strings nl, 3-4cm  · Uterus: normal size, shape and consistency  · Adnexa: no adnexal tenderness present, no adnexal masses present  · Perineum: perineum within normal limits, no evidence of trauma, no rashes or skin lesions present  · Anus: anus within normal limits, no hemorrhoids present  · Inguinal Lymph Nodes: no lymphadenopathy present    Skin  · General Inspection: no rash, no lesions identified    Neurologic/Psychiatric  · Mental Status:  · Orientation: grossly oriented to person, place and time  · Mood and Affect: mood normal, affect appropriate        Assessment & Plan:  · Routine gynecologic examination. Pap/HPV neg 2016 -> q 5yrs (will do next year)  · Premature menopause  · Vivelle patch and Carrizales Raw IUD. Doing well. RF Vivelle. · Vaginal itching -> nuswab BV/yeast  · Counseled re: diet, exercise, healthy lifestyle  · Return for yearly wellness visits  · Rec annual mammogram.  Done today in our office. · Patient verbalized understanding           Orders Placed This Encounter    NUSWAB VAGINOSIS + CANDIDA    estradiol (VIVELLE) 0.05 mg/24 hr     Si Patch by TransDERmal route two (2) days a week.      Dispense:  24 Patch     Refill:  4

## 2019-10-10 ENCOUNTER — OFFICE VISIT (OUTPATIENT)
Dept: OBGYN CLINIC | Age: 44
End: 2019-10-10

## 2019-10-10 VITALS
BODY MASS INDEX: 28.51 KG/M2 | DIASTOLIC BLOOD PRESSURE: 73 MMHG | WEIGHT: 151 LBS | HEART RATE: 85 BPM | HEIGHT: 61 IN | SYSTOLIC BLOOD PRESSURE: 114 MMHG

## 2019-10-10 DIAGNOSIS — Z01.419 ENCOUNTER FOR GYNECOLOGICAL EXAMINATION: Primary | ICD-10-CM

## 2019-10-10 DIAGNOSIS — Z30.431 SURVEILLANCE OF (INTRAUTERINE) CONTRACEPTIVE DEVICE: ICD-10-CM

## 2019-10-10 DIAGNOSIS — N95.1 MENOPAUSAL SYNDROME (HOT FLASHES): ICD-10-CM

## 2019-10-10 DIAGNOSIS — N89.8 VAGINAL ITCHING: ICD-10-CM

## 2019-10-10 RX ORDER — ESTRADIOL 0.05 MG/D
1 FILM, EXTENDED RELEASE TRANSDERMAL
Qty: 24 PATCH | Refills: 4 | Status: SHIPPED | OUTPATIENT
Start: 2019-10-10 | End: 2020-04-06 | Stop reason: SDUPTHER

## 2019-10-10 RX ORDER — ESTRADIOL 0.04 MG/D
1 FILM, EXTENDED RELEASE TRANSDERMAL
Qty: 24 PATCH | Refills: 4 | Status: CANCELLED | OUTPATIENT
Start: 2019-10-10

## 2019-10-13 LAB
A VAGINAE DNA VAG QL NAA+PROBE: ABNORMAL SCORE
BVAB2 DNA VAG QL NAA+PROBE: ABNORMAL SCORE
C ALBICANS DNA VAG QL NAA+PROBE: POSITIVE
C GLABRATA DNA VAG QL NAA+PROBE: NEGATIVE
MEGA1 DNA VAG QL NAA+PROBE: ABNORMAL SCORE

## 2019-10-14 RX ORDER — FLUCONAZOLE 150 MG/1
150 TABLET ORAL
Qty: 1 TAB | Refills: 0 | Status: SHIPPED | OUTPATIENT
Start: 2019-10-14 | End: 2019-10-14

## 2020-03-28 ENCOUNTER — HOSPITAL ENCOUNTER (EMERGENCY)
Age: 45
Discharge: HOME | End: 2020-03-28
Attending: EMERGENCY MEDICINE
Payer: OTHER MISCELLANEOUS

## 2020-03-28 VITALS
TEMPERATURE: 98.1 F | DIASTOLIC BLOOD PRESSURE: 67 MMHG | OXYGEN SATURATION: 100 % | HEART RATE: 83 BPM | SYSTOLIC BLOOD PRESSURE: 129 MMHG | RESPIRATION RATE: 16 BRPM

## 2020-03-28 DIAGNOSIS — Z20.822 EXPOSURE TO COVID-19 VIRUS: Primary | ICD-10-CM

## 2020-03-28 PROCEDURE — 87635 SARS-COV-2 COVID-19 AMP PRB: CPT

## 2020-03-28 PROCEDURE — 99281 EMR DPT VST MAYX REQ PHY/QHP: CPT

## 2020-03-28 NOTE — DISCHARGE INSTRUCTIONS
Prevention steps for patients with confirmed or suspected COVID-19 who do not need to be hospitalized    Timing for Self-Isolation    If you have been exposed but do not have symptoms:  If you suspect you have been exposed to someone with COVID-19 (novel coronavirus) and don't have any symptoms, you should self-isolate at home for 14 days. If you have symptoms whether or not you have been tested: If you have symptoms suggestive of COVID-19, such as fever or cough, regardless of whether you have been tested, you should self-isolate at home until 72 hours (3 days) after your symptoms have completely resolved AND 7 days after your symptoms first started, whichever is later. How to Properly Self-Isolate Due to COVID-19    Stay home except to get medical care  People who are mildly ill with COVID-19 are able to isolate at home during their illness. You should restrict activities outside your home, except for getting medical care. Do not go to work, school, or public areas. Avoid using public transportation, ride-sharing, or taxis. Separate yourself from other people and animals in your home  People: As much as possible, you should stay in a specific room and away from other people in your home. Also, you should use a separate bathroom, if available. Animals: You should restrict contact with pets and other animals while you are sick with COVID-19, just like you would around other people. Although there have not been reports of pets or other animals becoming sick with COVID-19, it is still recommended that people sick with COVID-19 limit contact with animals until more information is known about the virus. When possible, have another member of your household care for your animals while you are sick. If you are sick with COVID-19, avoid contact with your pet, including petting, snuggling, being kissed or licked, and sharing food.  If you must care for your pet or be around animals while you are sick, wash your hands before and after you interact with pets and wear a facemask. Call ahead before visiting your doctor  If you have a medical appointment, call the healthcare provider and tell them that you have or may have COVID-19. This will help the healthcare providers office take steps to keep other people from getting infected or exposed. Wear a facemask  You should wear a facemask when you are around other people (e.g., sharing a room or vehicle) or pets and before you enter a healthcare providers office. If you are not able to wear a facemask (for example, because it causes trouble breathing), then people who live with you should not stay in the same room with you, or they should wear a facemask if they enter your room. Cover your coughs and sneezes  Cover your mouth and nose with a tissue when you cough or sneeze. Throw used tissues in a lined trash can. Immediately wash your hands with soap and water for at least 20 seconds or, if soap and water are not available, clean your hands with an alcohol-based hand  that contains at least 60% alcohol. Clean your hands often  Wash your hands often with soap and water for at least 20 seconds, especially after blowing your nose, coughing, or sneezing; going to the bathroom; and before eating or preparing food. If soap and water are not readily available, use an alcohol-based hand  with at least 60% alcohol, covering all surfaces of your hands and rubbing them together until they feel dry. Soap and water are the best option if hands are visibly dirty. Avoid touching your eyes, nose, and mouth with unwashed hands. Avoid sharing personal household items  You should not share dishes, drinking glasses, cups, eating utensils, towels, or bedding with other people or pets in your home. After using these items, they should be washed thoroughly with soap and water.     Clean all high-touch surfaces everyday  High touch surfaces include counters, tabletops, doorknobs, bathroom fixtures, toilets, phones, keyboards, tablets, and bedside tables. Also, clean any surfaces that may have blood, stool, or body fluids on them. Use a household cleaning spray or wipe, according to the label instructions. Labels contain instructions for safe and effective use of the cleaning product including precautions you should take when applying the product, such as wearing gloves and making sure you have good ventilation during use of the product. Monitor your symptoms  Seek prompt medical attention if your illness is worsening (e.g., difficulty breathing). Before seeking care, call your healthcare provider and tell them that you have, or are being evaluated for, COVID-19. Put on a facemask before you enter the facility. These steps will help the healthcare providers office to keep other people in the office or waiting room from getting infected or exposed. Ask your healthcare provider to call the local or state health department. Persons who are placed under active monitoring or facilitated self-monitoring should follow instructions provided by their local health department or occupational health professionals, as appropriate. When working with your local health department check their available hours. If you have a medical emergency and need to call 911, notify the dispatch personnel that you have, or are being evaluated for COVID-19. If possible, put on a facemask before emergency medical services arrive. Discontinuing home isolation  Patients with confirmed COVID-19 should remain under home isolation precautions until the risk of secondary transmission to others is thought to be low based on the above CDC recommendations. The decision to discontinue home isolation precautions should be made on a case-by-case basis, in consultation with healthcare providers and state and local health departments.        Further resources and [facility] Information Line  Please visit the [facility] website for more information. [employer]. [place] residents with questions about COVID-19 can call the [address] at [address].

## 2020-03-28 NOTE — ED PROVIDER NOTES
EMERGENCY DEPARTMENT HISTORY AND PHYSICAL EXAM      Date: 3/28/2020  Patient Name: Rainer Gonzalez    History of Presenting Illness     No chief complaint on file. History Provided By: Patient    HPI: Rainer Gonzalez, 38 y.o. female with PMHx significant for HTN and asthma, presents by POV to the ED with cc of headache and diarrhea earlier today. Her Sx have resolved with Tylenol. The patient is a [employer] who has had a positive exposure to another individual with COVID-19 between 3/19-3/26. There has been no recent international or domestic travel. There are no other complaints, changes, or physical findings at this time. Social Hx: Tobacco (denies), EtOH (social), Illicit drug use (denies)     PCP: Florencia Fothergill, MD    No current facility-administered medications on file prior to encounter. Current Outpatient Medications on File Prior to Encounter   Medication Sig Dispense Refill    phenazopyridine HCl (AZO PO) Take  by mouth.  estradiol (VIVELLE) 0.05 mg/24 hr 1 Patch by TransDERmal route two (2) days a week. 24 Patch 4    atorvastatin (LIPITOR) 10 mg tablet Take 1 Tab by mouth daily. Indications: stroke prevention 90 Tab 3    losartan (COZAAR) 50 mg tablet Take 1 Tab by mouth daily. 90 Tab 3    montelukast (SINGULAIR) 10 mg tablet Take 1 Tab by mouth nightly. 90 Tab 3    levonorgestrel (MIRENA) 20 mcg/24 hours (5 yrs) 52 mg IUD 1 Device by IntraUTERine route once.  loratadine (CLARITIN) 10 mg tablet Take 10 mg by mouth.  polyethylene glycol (MIRALAX) 17 gram packet Take 1 Packet by mouth two (2) times a day. (Patient taking differently: Take 17 g by mouth daily. ) 1 Each 3    azelastine (ASTELIN) 137 mcg (0.1 %) nasal spray 1 Spray once. Use in each nostril as directed      cholecalciferol, VITAMIN D3, (VITAMIN D3) 5,000 unit tab tablet Take 5,000 Units by mouth daily.       ascorbic acid, vitamin C, (VITAMIN C) 1,000 mg tablet Take 1,000 mg by mouth.      fluticasone (FLONASE) 50 mcg/Actuation nasal spray 2 Sprays by Both Nostrils route nightly. Past History     Past Medical History:  Past Medical History:   Diagnosis Date    Asthma     Environmental allergies     High cholesterol     Hx of mammogram 10/10/2019    Negative. No mammographic evidence of malignancy    Hypertension     IUD (intrauterine device) in place 03/16/2017    [facility]. Placed for endometrial protection with HRT.  MRSA (methicillin resistant staph aureus) culture positive 2010    vulvar    Premature menopause     Routine Papanicolaou smear 05/11/2016    Pap/HPV neg (media tab)       Past Surgical History:  Past Surgical History:   Procedure Laterality Date    COLONOSCOPY N/A 10/30/2017    COLONOSCOPY performed by Brian Escalante MD at [facility] ENDOSCOPY    COLONOSCOPY,DIAGNOSTIC  10/30/2017         HX COLONOSCOPY      HX HEENT      wisdom teeth       Family History:  Family History   Problem Relation Age of Onset    Hypertension Mother     Cancer Father         NHL.  Hypertension Father     Asthma Sister        Social History:  Social History     Tobacco Use    Smoking status: Never Smoker    Smokeless tobacco: Never Used   Substance Use Topics    Alcohol use: Yes     Alcohol/week: 1.0 standard drinks     Types: 1 Glasses of wine per week     Frequency: 2-4 times a month     Drinks per session: 1 or 2     Binge frequency: Weekly     Comment: occasional     Drug use: No       Allergies: Allergies   Allergen Reactions    Bactrim [Sulfamethoprim] Rash         Review of Systems   Review of Systems   Constitutional: Negative for chills, diaphoresis and fever. HENT: Negative for congestion, ear pain, rhinorrhea and sore throat. Respiratory: Negative for cough and shortness of breath. Cardiovascular: Negative for chest pain. Gastrointestinal: Negative for abdominal pain, constipation, diarrhea, nausea and vomiting.    Genitourinary: Negative for difficulty urinating, dysuria, frequency and hematuria. Musculoskeletal: Negative for arthralgias and myalgias. Neurological: Negative for headaches. All other systems reviewed and are negative. Physical Exam   Physical Exam  Vitals signs and nursing note reviewed. Constitutional:       General: She is not in acute distress. Appearance: She is well-developed. She is not diaphoretic. Comments: 38 y.o.  female    HENT:      Head: Normocephalic and atraumatic. Eyes:      General:         Right eye: No discharge. Left eye: No discharge. Conjunctiva/sclera: Conjunctivae normal.   Neck:      Musculoskeletal: Normal range of motion and neck supple. Cardiovascular:      Rate and Rhythm: Normal rate. Heart sounds: No murmur. Pulmonary:      Effort: Pulmonary effort is normal. No respiratory distress. Skin:     General: Skin is warm and dry. Neurological:      Mental Status: She is alert and oriented to person, place, and time. Psychiatric:         Behavior: Behavior normal.         Diagnostic Study Results     Labs - COVID-19 testing pending at discharge. Results will be followed by [id number] Radiologic Studies - None    Medical Decision Making   I am the first provider for this patient. I reviewed the vital signs, available nursing notes, past medical history, past surgical history, family history and social history. Vital Signs-Reviewed the patient's vital signs. Patient Vitals for the past 12 hrs:   Temp Pulse Resp BP SpO2   03/28/20 1434 98.1 °F (36.7 °C) 83 16 129/67 100 %       Records Reviewed: Nursing Notes    Provider Notes (Medical Decision Making): The evaluation, management, and disposition decisions of this patient have been made in the context of the current and rapidly developing COVID-19 pandemic. In my clinical judgment, the balance of clinical factors dictate expedited evaluation and discharge from the ED.  I have carefully considered the risk and benefits of prolonged ED workups and/or hospitalization vs their risk of acquiring or transmitting COVID-19. I have made reasonable efforts to conserve healthcare resources and defer to safe outpatient alternatives when feasible. I have also discussed the importance of social distancing and proper hygiene to the patient. Based on an appropriate medical screening exam, there is currently no evidence of an emergency medical condition in the patient, and she is clinically safe for discharge. This was a collective decision made with the patient and/or any available family/caretakers. They expressed understanding and agreement with the above. ED Course:   Initial assessment performed. The patients presenting problems have been discussed, and they are in agreement with the care plan formulated and outlined with them. I have encouraged them to ask questions as they arise throughout their visit. Critical Care Time: None    Disposition:  DISCHARGE NOTE:  3:06 PM  The pt is ready for discharge. The pt's signs, symptoms, diagnosis, and discharge instructions have been discussed and pt has conveyed their understanding. The pt is to follow up as recommended or return to ER should their symptoms worsen. Plan has been discussed and pt is in agreement. PLAN:  1. Current Discharge Medication List      CONTINUE these medications which have NOT CHANGED    Details   phenazopyridine HCl (AZO PO) Take  by mouth.      estradiol (VIVELLE) 0.05 mg/24 hr 1 Patch by TransDERmal route two (2) days a week. Qty: 24 Patch, Refills: 4      atorvastatin (LIPITOR) 10 mg tablet Take 1 Tab by mouth daily. Indications: stroke prevention  Qty: 90 Tab, Refills: 3    Associated Diagnoses: Mixed hyperlipidemia      losartan (COZAAR) 50 mg tablet Take 1 Tab by mouth daily.   Qty: 90 Tab, Refills: 3    Associated Diagnoses: Essential hypertension      montelukast (SINGULAIR) 10 mg tablet Take 1 Tab by mouth nightly. Qty: 90 Tab, Refills: 3    Associated Diagnoses: Environmental allergies      levonorgestrel (MIRENA) 20 mcg/24 hours (5 yrs) 52 mg IUD 1 Device by IntraUTERine route once. loratadine (CLARITIN) 10 mg tablet Take 10 mg by mouth.      polyethylene glycol (MIRALAX) 17 gram packet Take 1 Packet by mouth two (2) times a day. Qty: 1 Each, Refills: 3      azelastine (ASTELIN) 137 mcg (0.1 %) nasal spray 1 Spray once. Use in each nostril as directed      cholecalciferol, VITAMIN D3, (VITAMIN D3) 5,000 unit tab tablet Take 5,000 Units by mouth daily. ascorbic acid, vitamin C, (VITAMIN C) 1,000 mg tablet Take 1,000 mg by mouth. fluticasone (FLONASE) 50 mcg/Actuation nasal spray 2 Sprays by Both Nostrils route nightly. 2.   Follow-up Information     Follow up With Special[facility] [address]  In 3 days          3. Follow up with [facility] for COVID-19 Results. Return to ED if worse     Diagnosis     Clinical Impression:   1. Exposure to Covid-19 Virus          Please note that this dictation was completed with [employer], the computer voice recognition software. Quite often unanticipated grammatical, syntax, homophones, and other interpretive errors are inadvertently transcribed by the computer software. Please disregards these errors. Please excuse any errors that have escaped final proofreading. This note will not be viewable in [address]

## 2020-03-30 ENCOUNTER — PATIENT OUTREACH (OUTPATIENT)
Dept: OTHER | Age: 45
End: 2020-03-30

## 2020-03-30 DIAGNOSIS — Z91.09 ENVIRONMENTAL ALLERGIES: ICD-10-CM

## 2020-03-30 DIAGNOSIS — E78.2 MIXED HYPERLIPIDEMIA: ICD-10-CM

## 2020-03-30 DIAGNOSIS — I10 ESSENTIAL HYPERTENSION: ICD-10-CM

## 2020-03-30 RX ORDER — LOSARTAN POTASSIUM 50 MG/1
50 TABLET ORAL DAILY
Qty: 90 TAB | Refills: 3 | Status: SHIPPED | OUTPATIENT
Start: 2020-03-30 | End: 2020-04-05

## 2020-03-30 RX ORDER — ATORVASTATIN CALCIUM 10 MG/1
10 TABLET, FILM COATED ORAL DAILY
Qty: 90 TAB | Refills: 3 | Status: SHIPPED | OUTPATIENT
Start: 2020-03-30 | End: 2020-06-02

## 2020-03-30 RX ORDER — MONTELUKAST SODIUM 10 MG/1
10 TABLET ORAL
Qty: 90 TAB | Refills: 3 | Status: SHIPPED | OUTPATIENT
Start: 2020-03-30 | End: 2020-06-02

## 2020-03-30 NOTE — TELEPHONE ENCOUNTER
PCP: Beatrice Wren MD    Last appt: 5/23/2019  Future Appointments   Date Time Provider Dottie Mathias   6/11/2020  9:30 AM Beatrice Wren MD [address]   10/22/2020  9:00 AM MAMMOGRAM, SHASHANK AGUILLON   10/22/2020  9:20 AM Pastor J Carlos MD [address] [place] [address]       Requested Prescriptions     Pending Prescriptions Disp Refills    losartan (COZAAR) 50 mg tablet 90 Tab 3     Sig: Take 1 Tab by mouth daily.  montelukast (Singulair) 10 mg tablet 90 Tab 3     Sig: Take 1 Tab by mouth nightly.  atorvastatin (LIPITOR) 10 mg tablet 90 Tab 3     Sig: Take 1 Tab by mouth daily.  Indications: stroke prevention

## 2020-03-30 NOTE — PROGRESS NOTES
COVID-19 Screening Initial Follow-up Note    Patient contacted regarding [id number] exposure was on 3/19-3/26. Care Transition Nurse/ Ambulatory Care Manager contacted the patient by telephone to perform post discharge assessment. Verified name and  with patient as identifiers. Provided introduction to self, and explanation of the CTN/ACM role, and reason for call due to risk factors for infection and/or exposure to COVID-19. 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 Patient has following risk factors of: asthma - has action plan CTN/ACM reviewed discharge instructions, medical action plan and red flags such as increased shortness of breath, increasing fever and signs of decompensation with patient who verbalized understanding. Discussed exposure protocols and quarantine with CDC Guidelines What to do if you are sick with coronavirus disease 2019 Patient who was given an opportunity for questions and concerns. The patient agrees to contact the [facility] exposure line [phone], [facility]  [phone] and PCP office for questions related to their healthcare. CTN/ACM provided contact information for future reference. Reviewed and educated patient on any new and changed medications related to discharge diagnosis     Plan for follow-up call in 1-2 days based on severity of symptoms and risk factors  Will call on 3/31 for results.

## 2020-03-31 ENCOUNTER — PATIENT OUTREACH (OUTPATIENT)
Dept: OTHER | Age: 45
End: 2020-03-31

## 2020-03-31 LAB
Lab: NEGATIVE
REFERENCE LAB,REFLB: NORMAL
TEST DESCRIPTION:,ATST: NORMAL

## 2020-03-31 NOTE — PROGRESS NOTES
Patient resolved from Transition of Care episode on 3/31  Patient/family has been provided the following resources and education related to COVID-19:                         Signs, symptoms and red flags related to COVID-19            CDC exposure and quarantine guidelines            [facility] exposure contact - [phone]                         Patient currently reports that the following symptoms have improved:  no symptoms - tested due to exposure at work. No further outreach scheduled with this CTN/ACM. Episode of Care resolved. Patient has this CTN/ACM contact information if future needs arise.

## 2020-04-04 DIAGNOSIS — I10 ESSENTIAL HYPERTENSION: ICD-10-CM

## 2020-04-05 RX ORDER — LOSARTAN POTASSIUM 50 MG/1
TABLET ORAL
Qty: 90 TAB | Refills: 0 | Status: SHIPPED | OUTPATIENT
Start: 2020-04-05 | End: 2020-04-06 | Stop reason: SDUPTHER

## 2020-04-06 ENCOUNTER — VIRTUAL VISIT (OUTPATIENT)
Dept: INTERNAL MEDICINE CLINIC | Age: 45
End: 2020-04-06

## 2020-04-06 DIAGNOSIS — Z91.09 ENVIRONMENTAL ALLERGIES: ICD-10-CM

## 2020-04-06 DIAGNOSIS — E78.2 MIXED HYPERLIPIDEMIA: ICD-10-CM

## 2020-04-06 DIAGNOSIS — I10 ESSENTIAL HYPERTENSION: Primary | ICD-10-CM

## 2020-04-06 RX ORDER — LOSARTAN POTASSIUM 50 MG/1
TABLET ORAL
Qty: 90 TAB | Refills: 3 | Status: SHIPPED | OUTPATIENT
Start: 2020-04-06 | End: 2021-03-31 | Stop reason: SDUPTHER

## 2020-04-06 RX ORDER — AZELASTINE 1 MG/ML
1 SPRAY, METERED NASAL 2 TIMES DAILY
Qty: 3 BOTTLE | Refills: 3 | Status: SHIPPED | OUTPATIENT
Start: 2020-04-06 | End: 2021-07-01 | Stop reason: SDUPTHER

## 2020-04-06 RX ORDER — AZELASTINE 1 MG/ML
1 SPRAY, METERED NASAL 2 TIMES DAILY
COMMUNITY
End: 2020-04-06 | Stop reason: SDUPTHER

## 2020-04-06 RX ORDER — ESTRADIOL 0.05 MG/D
1 FILM, EXTENDED RELEASE TRANSDERMAL
Qty: 24 PATCH | Refills: 3 | Status: SHIPPED | OUTPATIENT
Start: 2020-04-09 | End: 2020-10-22 | Stop reason: SDUPTHER

## 2020-04-06 NOTE — PROGRESS NOTES
Nadiya Flores is a 38 y.o. female who presents for follow up. Last seen May 2019. Treated for HTN. Losartan 50mg once a day. Both parents with HTN. BP controlled. No dizziness, headaches, or visual changes. Up to date on eye exam.  Active at home, walking 2-3 days a week. Working on weight loss. Changed diet and increased exercise, lost 12# in the past year.      Environmental allergies. On singulair, claritin, astelin, and flonase. On immunotherapy for many years,  Sees Dr. Jamila Martinez, allergy. History of asthma. No albuterol.      On statin for 20's. No myalgias. Last labs in January.       Sees gyn, Dr. Vicki Rod. Normal pap. Has IUD, Coretha Rhett. No menses. normal urination. annual mammogram.      Prior colonoscopy in 2017. Dr. Christi Tiwari. torutous colon. Otherwise normal.   For constipation. Given miralax, once a day.       Up to date on eye and dental.       Has 3 dogs, lives with . Is exercising more regularly. 4-5 times a week. Feels well with exertion. Weight 144#. Working as a nurse. This is an established visit conducted via telemedicine with video. The patient has been instructed that this meets HIPAA criteria and acknowledges and agrees to this method of visitation. Pursuant to the emergency declaration under the [facility], [facility] waiver authority and the [employer] and [employer] Act, this Virtual Visit was conducted, with patient's consent, to reduce the patient's risk of exposure to COVID-19 and provide continuity of care for an established patient. Services were provided through a video synchronous discussion virtually to substitute for in-person clinic visit. Past Medical History:   Diagnosis Date    Asthma     Environmental allergies     High cholesterol     Hx of mammogram 10/10/2019    Negative.  No mammographic evidence of malignancy    Hypertension     IUD (intrauterine device) in place 03/16/2017    [facility]. Placed for endometrial protection with HRT.  MRSA (methicillin resistant staph aureus) culture positive 2010    vulvar    Premature menopause     Routine Papanicolaou smear 05/11/2016    Pap/HPV neg (media tab)       Family History   Problem Relation Age of Onset    Hypertension Mother     Cancer Father         NHL.  Hypertension Father     Asthma Sister        Social History     Socioeconomic History    Marital status:      Spouse name: Not on file    Number of children: Not on file    Years of education: Not on file    Highest education level: Not on file   Occupational History    Not on file   Social Needs    Financial resource strain: Not on file    Food insecurity     Worry: Not on file     Inability: Not on file    Transportation needs     Medical: Not on file     Non-medical: Not on file   Tobacco Use    Smoking status: Never Smoker    Smokeless tobacco: Never Used   Substance and Sexual Activity    Alcohol use: Yes     Alcohol/week: 1.0 standard drinks     Types: 1 Glasses of wine per week     Frequency: 2-4 times a month     Drinks per session: 1 or 2     Binge frequency: Weekly     Comment: occasional     Drug use: No    Sexual activity: Yes     Partners: Male     Birth control/protection: I.U.D.    Lifestyle    Physical activity     Days per week: Not on file     Minutes per session: Not on file    Stress: Not on file   Relationships    Social connections     Talks on phone: Not on file     Gets together: Not on file     Attends Church service: Not on file     Active member of club or organization: Not on file     Attends meetings of clubs or organizations: Not on file     Relationship status: Not on file    Intimate partner violence     Fear of current or ex partner: Not on file     Emotionally abused: Not on file     Physically abused: Not on file     Forced sexual activity: Not on file   Other Topics Concern    Not on file Social History Narrative    Not on file       Current Outpatient Medications on File Prior to Visit   Medication Sig Dispense Refill    levonorgestrel (KYLEENA IU) by IntraUTERine route.  montelukast (Singulair) 10 mg tablet Take 1 Tab by mouth nightly. 90 Tab 3    atorvastatin (LIPITOR) 10 mg tablet Take 1 Tab by mouth daily. Indications: stroke prevention 90 Tab 3    loratadine (CLARITIN) 10 mg tablet Take 10 mg by mouth.  polyethylene glycol (MIRALAX) 17 gram packet Take 1 Packet by mouth two (2) times a day. (Patient taking differently: Take 17 g by mouth daily. ) 1 Each 3    cholecalciferol, VITAMIN D3, (VITAMIN D3) 5,000 unit tab tablet Take 5,000 Units by mouth daily.  ascorbic acid, vitamin C, (VITAMIN C) 1,000 mg tablet Take 1,000 mg by mouth.  fluticasone (FLONASE) 50 mcg/Actuation nasal spray 2 Sprays by Both Nostrils route nightly.  phenazopyridine HCl (AZO PO) Take  by mouth.  levonorgestrel (MIRENA) 20 mcg/24 hours (5 yrs) 52 mg IUD 1 Device by IntraUTERine route once. No current facility-administered medications on file prior to visit. Review of Systems  Pertinent items are noted in HPI. Objective:     Gen: well appearing female  HEENT: normal conjunctiva, no audible congestion, patient does not see oral erythema, has MMM  Neck: patient does not feel enlarged or tender LAD or masses  Resp: normal respiratory effort, no audible wheezing. CV: patient does not feel palpitations or heart irregularity  Abd: patient does not feel abdominal tenderness or mass, patient does not notice distension  Extrem: patient does not see swelling in ankles or joints. Neuro: Alert and oriented, able to answer questions without difficulty, able to move all extremities and walk normally        Assessment/Plan:       ICD-10-CM ICD-9-CM    1. Essential hypertension I10 401.9 losartan (COZAAR) 50 mg tablet   2. Environmental allergies Z91.09 V15.09    3.  Mixed hyperlipidemia E78.2 272.2    continue present medications. This was a telemedicine visit with video. Narayan Finn MD    Follow-up and Dispositions    · Return for FOLLOWUP AS SCHEDULED. Libby Gee

## 2020-06-02 DIAGNOSIS — E78.2 MIXED HYPERLIPIDEMIA: ICD-10-CM

## 2020-06-02 DIAGNOSIS — Z91.09 ENVIRONMENTAL ALLERGIES: ICD-10-CM

## 2020-06-02 RX ORDER — ATORVASTATIN CALCIUM 10 MG/1
10 TABLET, FILM COATED ORAL DAILY
Qty: 90 TAB | Refills: 3 | Status: SHIPPED | OUTPATIENT
Start: 2020-06-02 | End: 2021-07-01 | Stop reason: SDUPTHER

## 2020-06-02 RX ORDER — MONTELUKAST SODIUM 10 MG/1
TABLET ORAL
Qty: 90 TAB | Refills: 3 | Status: SHIPPED | OUTPATIENT
Start: 2020-06-02 | End: 2021-07-01 | Stop reason: SDUPTHER

## 2020-07-23 ENCOUNTER — OFFICE VISIT (OUTPATIENT)
Dept: PRIMARY CARE CLINIC | Age: 45
End: 2020-07-23

## 2020-07-23 VITALS
BODY MASS INDEX: 27.38 KG/M2 | HEIGHT: 61 IN | WEIGHT: 145 LBS | RESPIRATION RATE: 16 BRPM | SYSTOLIC BLOOD PRESSURE: 136 MMHG | DIASTOLIC BLOOD PRESSURE: 84 MMHG | OXYGEN SATURATION: 98 % | TEMPERATURE: 98 F | HEART RATE: 88 BPM

## 2020-07-23 DIAGNOSIS — L23.7 POISON IVY DERMATITIS: Primary | ICD-10-CM

## 2020-07-23 RX ORDER — PREDNISONE 10 MG/1
TABLET ORAL
Qty: 39 TAB | Refills: 0 | Status: SHIPPED | OUTPATIENT
Start: 2020-07-23 | End: 2020-10-22 | Stop reason: ALTCHOICE

## 2020-07-23 NOTE — PROGRESS NOTES
Chief Complaint   Patient presents with    Poison Ivy/Poison Oak/Poison Sumac Exposure     Patient in room #4 with complaints of poison ivy rash all over, working in yard the last two weeks, areas of blisters,itching and burning

## 2020-07-23 NOTE — PROGRESS NOTES
HISTORY OF PRESENT ILLNESS  Page Spence is a 38 y.o. female       Presents with a itchy red history of rash. Pt reports recent exposure to plant while outside doing yard work. Rash started 2 weeks ago on right upper arm and progressed to entire body. Has 2 lesions on face but not affecting sight or airway. Was working in garden and had exposure. Has tried over the counter products like calamine lotoion with minimal results. Very itchy. Past Medical History:   Diagnosis Date    Asthma     Environmental allergies     High cholesterol     Hx of mammogram 10/10/2019    Negative. No mammographic evidence of malignancy    Hypertension     IUD (intrauterine device) in place 03/16/2017    [facility]. Placed for endometrial protection with HRT.  MRSA (methicillin resistant staph aureus) culture positive 2010    vulvar    Premature menopause     Routine Papanicolaou smear 05/11/2016    Pap/HPV neg (media tab)     Past Surgical History:   Procedure Laterality Date    COLONOSCOPY N/A 10/30/2017    [facility] Ha[facility]OSCOPY    COLONOSCOPY,DIAGNOSTIC  10/30/2017         HX COLONOSCOPY      HX HEENT      wisdom teeth     Allergies   Allergen Reactions    Bactrim [Sulfamethoprim] Rash       Review of Systems   Constitutional: Negative for chills and malaise/fatigue. HENT: Negative for congestion and sore throat. Eyes: Negative for discharge and redness. Respiratory: Negative for cough. Skin: Positive for itching and rash. Neurological: Negative for headaches. Endo/Heme/Allergies: Positive for environmental allergies. Psychiatric/Behavioral: Negative for depression. Physical Exam  Constitutional:       Appearance: Normal appearance. HENT:      Head: Normocephalic and atraumatic. Mouth/Throat:      Mouth: Mucous membranes are moist.   Eyes:      Extraocular Movements: Extraocular movements intact.       Pupils: Pupils are equal, round, and reactive to light. Neck:      Musculoskeletal: Normal range of motion. Cardiovascular:      Rate and Rhythm: Normal rate. Pulses: Normal pulses. Pulmonary:      Effort: Pulmonary effort is normal.   Musculoskeletal: Normal range of motion. Skin:     Capillary Refill: Capillary refill takes less than 2 seconds. Findings: Rash present. Rash is macular and vesicular. Comments: Multiple erythematous fluid filled vesicles on bilateral upper and lower extremties. Neurological:      General: No focal deficit present. Mental Status: She is alert. ASSESSMENT and PLAN    ICD-10-CM ICD-9-CM    1. Poison ivy dermatitis  L23.7 692.6      Encounter Diagnoses   Name Primary?  Poison ivy dermatitis Yes     Orders Placed This Encounter    predniSONE (DELTASONE) 10 mg tablet   medication as directed with food. Topical itch reliever. Oral antihistamine. It was a pleasure to see you in the office today. Please call if you have any further questions or concerns. I am available through the portal system.      Signed By: JENNIFER Osman     July 23, 2020

## 2020-07-23 NOTE — PATIENT INSTRUCTIONS
Topical symptomatic therapy  Soothing measures such as oatmeal baths and cool, wet compresses are anecdotally helpful. Topical treatment with compounds containing menthol and  (calamine lotion) may also provide symptomatic relief. Topical astringents such as (Burow's solution) or aluminum sulfate used under occlusion may be useful to dry weeping lesions  A soap mixture of ethoxylate and sodium lauroyl sarcosinate surfactants may also be of benefit        Poison [place], [place], and [place]: Care Instructions  Your Care Instructions     Poison ivy, poison oak, and poison sumac are plants that can cause a skin rash upon contact. The red, itchy rash often shows up in lines or streaks and may cause fluid-filled blisters or large, raised hives. The rash is caused by an allergic reaction to an oil in poison ivy, oak, and sumac. The rash may occur when you touch the plant or when you touch clothing, pet fur, sporting gear, gardening tools, or other objects that have come in contact with one of these plants. You cannot catch or spread the rash, even if you touch it or the blister fluid, because the plant oil will already have been absorbed or washed off the skin. The rash may seem to be spreading, but either it is still developing from earlier contact or you have touched something that still has the plant oil on it. Follow-up care is a key part of your treatment and safety. Be sure to make and go to all appointments, and call your doctor if you are having problems. It's also a good idea to know your test results and keep a list of the medicines you take. How can you care for yourself at home? · If your doctor prescribed a cream, use it as directed. If your doctor prescribed medicine, take it exactly as prescribed. Call your doctor if you think you are having a problem with your medicine. · Use cold, wet cloths to reduce itching. · Keep cool, and stay out of the sun. · Leave the rash open to the air.   · Wash all clothing or other things that may have come in contact with the plant oil. · Avoid most lotions and ointments until the rash heals. Calamine lotion may help relieve symptoms of a plant rash. Use it 3 or 4 times a day. To prevent poison ivy exposure  If you know that you will be near poison ivy, oak, or sumac, you can try these options:  · Use a product designed to help prevent plant oil from getting on the skin. These products, such as Ivy X Pre-Contact Skin Solution, come in lotions, sprays, or towelettes. You put the product on your skin right before you go outdoors. · If you did not use a preventive product and you have had contact with plant oil, clean it off your skin as soon as possible. Use a product such as [employer]ec[employer] Original Outdoor Skin Cleanser. These products can also be used to clean plant oil from clothing or tools. When should you call for help? Call your doctor now or seek immediate medical care if:  · Your rash gets worse, and you start to feel bad and have a fever, a stiff neck, nausea, and vomiting. · You have signs of infection, such as:  ? Increased pain, swelling, warmth, or redness. ? Red streaks leading from the rash. ? Pus draining from the rash. ? A fever. Watch closely for changes in your health, and be sure to contact your doctor if:  · You have new blisters or bruises, or the rash spreads and looks like a sunburn. · The rash gets worse, or it comes back after nearly disappearing. · You think a medicine you are using is making your rash worse. · Your rash does not clear up after 1 to 2 weeks of home treatment. · You have joint aches or body aches with your rash. Where can you learn more? Go to http://linda-po.info/  Enter C996 in the search box to learn more about \"Poison [place], [place], and [place]: Care Instructions. \"  Current as of: October 31, 2019               Content Version: 12.5  © 7454-8815 Healthwise, [employer].    Care instructions adapted under license by [employer] (which disclaims liability or warranty for this information). If you have questions about a medical condition or this instruction, always ask your healthcare professional. [address] any warranty or liability for your use of this information.

## 2020-08-06 ENCOUNTER — EMPLOYEE WELLNESS (OUTPATIENT)
Dept: OTHER | Facility: CLINIC | Age: 45
End: 2020-08-06

## 2020-08-06 LAB
CHOLEST SERPL-MCNC: 168 MG/DL
GLUCOSE SERPL-MCNC: 102 MG/DL (ref 65–100)
HDLC SERPL-MCNC: 49 MG/DL
LDLC SERPL CALC-MCNC: 87.8 MG/DL (ref 0–100)
TRIGL SERPL-MCNC: 156 MG/DL (ref ?–150)

## 2020-10-21 NOTE — PROGRESS NOTES
.Annual exam ages 40-58    [address] is a ,  41 y.o. female [facility] No LMP recorded. (Menstrual status: IUD). , who presents for her annual checkup. 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 Since her last annual GYN exam about one year ago,  she has the following changes in her health history:   - none    Premature menopause. On estradiol patch 0.05 and has IUD (?kyleena?) for endometrial protection, placed 3/16/17    ADDITIONAL CONCERNS:  She is having no significant problems. With regard to the Gardasil vaccine, she is older than the age for which it is FDA approved. Menstrual status:    Her periods are nonexistent. Contraception:    The current method of family planning is IUD. Sexual history:     reports being sexually active and has had partner(s) who are Male. She reports using the following method of birth control/protection: I.U.D..        Pap and Mammogram History:    Her most recent Pap smear was normal, HPV was negative, obtained 16. She does not have a history of abnormal pap smears. The patient had her mammogram today in our office. Osteoporosis History:    Family history does not include a first or second degree relative with osteopenia or osteoporosis. A bone density scan has never been done. Past Medical History:   Diagnosis Date    Asthma     Environmental allergies     High cholesterol     Hx of mammogram 10/10/2019    Negative. No mammographic evidence of malignancy    Hypertension     IUD (intrauterine device) in place 2017    [facility]. Placed for endometrial protection with HRT.     MRSA (methicillin resistant staph aureus) culture positive     vulvar    Premature menopause     Routine Papanicolaou smear 2016    Pap/HPV neg (media tab)     Past Surgical History:   Procedure Laterality Date    COLONOSCOPY N/A 10/30/2017    COLONOSCOPY performed by Ayaan Frederick MD at [facility]    COLONOSCOPY,DIAGNOSTIC  10/30/2017         HX COLONOSCOPY      HX HEENT      wisdom teeth     OB History    Para Term  AB Living   0 0 0 0 0 0   SAB TAB Ectopic Molar Multiple Live Births   0 0 0 0 0 0       Current Outpatient Medications   Medication Sig Dispense Refill    predniSONE (DELTASONE) 10 mg tablet Take 6 pills po  x 3 days, 4 pills x 3 days, 2 pills x 3 days, 1 pill x 3 days 39 Tab 0    atorvastatin (LIPITOR) 10 mg tablet TAKE 1 TAB BY MOUTH DAILY. INDICATIONS: STROKE PREVENTION 90 Tab 3    montelukast (SINGULAIR) 10 mg tablet TAKE 1 TABLET BY MOUTH EVERY DAY AT NIGHT 90 Tab 3    levonorgestrel (KYLEENA IU) by IntraUTERine route.  losartan (COZAAR) 50 mg tablet TAKE 1 TABLET BY MOUTH EVERY DAY 90 Tab 3    estradioL (VIVELLE) 0.05 mg/24 hr 1 Patch by TransDERmal route two (2) days a week. 24 Patch 3    azelastine (ASTELIN) 137 mcg (0.1 %) nasal spray 1 [place] by Both Nostrils route two (2) times a day. Use in each nostril as directed 3 Bottle 3    phenazopyridine HCl (AZO PO) Take  by mouth as needed.  loratadine (CLARITIN) 10 mg tablet Take 10 mg by mouth daily as needed.  polyethylene glycol (MIRALAX) 17 gram packet Take 1 Packet by mouth two (2) times a day. (Patient taking differently: Take 17 g by mouth daily. ) 1 Each 3    cholecalciferol, VITAMIN D3, (VITAMIN D3) 5,000 unit tab tablet Take 5,000 Units by mouth daily.  ascorbic acid, vitamin C, (VITAMIN C) 1,000 mg tablet Take 1,000 mg by mouth daily.  fluticasone (FLONASE) 50 mcg/Actuation nasal spray 2 Sprays by Both Nostrils route nightly.        Allergies: Bactrim [sulfamethoprim]   Social History     Socioeconomic History    Marital status:      Spouse name: Not on file    Number of children: Not on file    Years of education: Not on file    Highest education level: Not on file   Occupational History    Not on file   Social Needs    Financial resource strain: Not on file    Food insecurity     Worry: Not on file     Inability: Not on file    Transportation needs     Medical: Not on file     Non-medical: Not on file   Tobacco Use    Smoking status: Never Smoker    Smokeless tobacco: Never Used   Substance and Sexual Activity    Alcohol use: Yes     Alcohol/week: 1.0 standard drinks     Types: 1 Glasses of wine per week     Frequency: 2-4 times a month     Drinks per session: 1 or 2     Binge frequency: Weekly     Comment: occasional     Drug use: No    Sexual activity: Yes     Partners: Male     Birth control/protection: I.U.D. Lifestyle    Physical activity     Days per week: Not on file     Minutes per session: Not on file    Stress: Not on file   Relationships    Social connections     Talks on phone: Not on file     Gets together: Not on file     Attends Yarsanism service: Not on file     Active member of club or organization: Not on file     Attends meetings of clubs or organizations: Not on file     Relationship status: Not on file    Intimate partner violence     Fear of current or ex partner: Not on file     Emotionally abused: Not on file     Physically abused: Not on file     Forced sexual activity: Not on file   Other Topics Concern    Not on file   Social History Narrative    Not on file     Tobacco History:  reports that she has never smoked. She has never used smokeless tobacco.  Alcohol Abuse:  reports current alcohol use of about 1.0 standard drinks of alcohol per week. Drug Abuse:  reports no history of drug use. There is no problem list on file for this patient. Family History   Problem Relation Age of Onset    Hypertension Mother     Cancer Father         NHL.      Hypertension Father     Asthma Sister        Review of Systems - History obtained from the patient  Constitutional: negative for weight loss, fever, night sweats  HEENT: negative for hearing loss, earache, congestion, snoring, sorethroat  CV: negative for chest pain, palpitations, edema  Resp: negative for cough, shortness of breath, wheezing  GI: negative for change in bowel habits, abdominal pain, black or bloody stools  : negative for frequency, dysuria, hematuria, vaginal discharge  MSK: negative for back pain, joint pain, muscle pain  Breast: negative for breast lumps, nipple discharge, galactorrhea  Skin :negative for itching, rash, hives  Neuro: negative for dizziness, headache, confusion, weakness  Psych: negative for anxiety, depression, change in mood  Heme/lymph: negative for bleeding, bruising, pallor    Physical Exam    There were no vitals taken for this visit.     Constitutional  · Appearance: well-nourished, well developed, alert, in no acute distress    HENT  · Head and Face: appears normal    Neck  · Inspection/Palpation: normal appearance, no masses or tenderness  · Lymph Nodes: no lymphadenopathy present  · Thyroid: gland size normal, nontender, no nodules or masses present on palpation    Chest  · Respiratory Effort: breathing unlabored  · Auscultation: normal breath sounds    Cardiovascular  · Heart:  · Auscultation: regular rate and rhythm without murmur    Breasts  · Inspection of Breasts: breasts symmetrical, no skin changes, no discharge present, nipple appearance normal, no skin retraction present  · Palpation of Breasts and Axillae: no masses present on palpation, no breast tenderness  · Axillary Lymph Nodes: no lymphadenopathy present    Gastrointestinal  · Abdominal Examination: abdomen non-tender to palpation, normal bowel sounds, no masses present  · Liver and spleen: no hepatomegaly present, spleen not palpable  · Hernias: no hernias identified    Genitourinary  · External Genitalia: normal appearance for age, no discharge present, no tenderness present, no inflammatory lesions present, no masses present, no atrophy present  · Vagina: normal vaginal vault without central or paravaginal defects, no discharge present, no inflammatory lesions present, no masses present  · Bladder: non-tender to palpation  · Urethra: appears normal  · Cervix: normal, IUD strings ~3-3.5cm  · Uterus: normal size, shape and consistency  · Adnexa: no adnexal tenderness present, no adnexal masses present  · Perineum: perineum within normal limits, no evidence of trauma, no rashes or skin lesions present  · Anus: anus within normal limits, no hemorrhoids present  · Inguinal Lymph Nodes: no lymphadenopathy present    Skin  · General Inspection: no rash, no lesions identified    Neurologic/Psychiatric  · Mental Status:  · Orientation: grossly oriented to person, place and time  · Mood and Affect: mood normal, affect appropriate        Assessment & Plan:  · Routine gynecologic examination. Pap/HPV today. · Premature menopause. Vivelle patch and [place] IUD (placed 3/2017). RF Vivelle. · Her current medical status is satisfactory with no evidence of significant gynecologic issues. · Counseled re: diet, exercise, healthy lifestyle  · Return for yearly wellness visits  · Rec annual mammogram.  Done today in our office. · Patient verbalized understanding           Orders Placed This Encounter    estradioL (VIVELLE) 0.05 mg/24 hr     Si Patch by TransDERmal route two (2) days a week. Dispense:  24 Patch     Refill:  3    PAP IG, APTIMA HPV AND RFX [id number] ([id number])     Order Specific Question:   Pap Source? Answer:   Cervical and Endocervical     Order Specific Question:   Total Hysterectomy? Answer:   No     Order Specific Question:   Supracervical Hysterectomy? Answer:   No     Order Specific Question:   Post Menopausal?     Answer:   No     Order Specific Question:   Hormone Therapy? Answer:   No     Order Specific Question:   IUD? Answer:   Yes     Order Specific Question:   Abnormal Bleeding? Answer:   No     Order Specific Question:   Pregnant     Answer:   No     Order Specific Question:   Post Partum? Answer:    No

## 2020-10-21 NOTE — PATIENT INSTRUCTIONS
Well Visit, Ages 25 to 48: Care Instructions Your Care Instructions Physical exams can help you stay healthy. Your doctor has checked your overall health and may have suggested ways to take good care of yourself. He or she also may have recommended tests. At home, you can help prevent illness with healthy eating, regular exercise, and other steps. Follow-up care is a key part of your treatment and safety. Be sure to make and go to all appointments, and call your doctor if you are having problems. It's also a good idea to know your test results and keep a list of the medicines you take. How can you care for yourself at home? · Reach and stay at a healthy weight. This will lower your risk for many problems, such as obesity, diabetes, heart disease, and high blood pressure. · Get at least 30 minutes of physical activity on most days of the week. Walking is a good choice. You also may want to do other activities, such as running, swimming, cycling, or playing tennis or team sports. Discuss any changes in your exercise program with your doctor. · Do not smoke or allow others to smoke around you. If you need help quitting, talk to your doctor about stop-smoking programs and medicines. These can increase your chances of quitting for good. · Talk to your doctor about whether you have any risk factors for sexually transmitted infections (STIs). Having one sex partner (who does not have STIs and does not have sex with anyone else) is a good way to avoid these infections. · Use birth control if you do not want to have children at this time. Talk with your doctor about the choices available and what might be best for you. · Protect your skin from too much sun. When you're outdoors from 10 a.m. to 4 p.m., stay in the shade or cover up with clothing and a hat with a wide brim. Wear sunglasses that block UV rays. Even when it's cloudy, put broad-spectrum sunscreen (SPF 30 or higher) on any exposed skin. · See a dentist one or two times a year for checkups and to have your teeth cleaned. · Wear a seat belt in the car. Follow your doctor's advice about when to have certain tests. These tests can spot problems early. For everyone · Cholesterol. Have the fat (cholesterol) in your blood tested after age 21. Your doctor will tell you how often to have this done based on your age, family history, or other things that can increase your risk for heart disease. · Blood pressure. Have your blood pressure checked during a routine doctor visit. Your doctor will tell you how often to check your blood pressure based on your age, your blood pressure results, and other factors. · Vision. Talk with your doctor about how often to have a glaucoma test. 
· Diabetes. Ask your doctor whether you should have tests for diabetes. · Colon cancer. Your risk for colorectal cancer gets higher as you get older. Some experts say that adults should start regular screening at age 48 and stop at age 76. Others say to start before age 48 or continue after age 76. Talk with your doctor about your risk and when to start and stop screening. For women · Breast exam and mammogram. Talk to your doctor about when you should have a clinical breast exam and a mammogram. Medical experts differ on whether and how often women under 50 should have these tests. Your doctor can help you decide what is right for you. · Cervical cancer screening test and pelvic exam. Begin with a Pap test at age 24. The test often is part of a pelvic exam. Starting at age 27, you may choose to have a Pap test, an HPV test, or both tests at the same time (called co-testing). Talk with your doctor about how often to have testing. · Tests for sexually transmitted infections (STIs). Ask whether you should have tests for STIs. You may be at risk if you have sex with more than one person, especially if your partners do not wear condoms. For men · Tests for sexually transmitted infections (STIs). Ask whether you should have tests for STIs. You may be at risk if you have sex with more than one person, especially if you do not wear a condom. · Testicular cancer exam. Ask your doctor whether you should check your testicles regularly. · Prostate exam. Talk to your doctor about whether you should have a blood test (called a PSA test) for prostate cancer. Experts differ on whether and when men should have this test. Some experts suggest it if you are older than 39 and are -American or have a father or brother who got prostate cancer when he was younger than 72. When should you call for help? Watch closely for changes in your health, and be sure to contact your doctor if you have any problems or symptoms that concern you. Where can you learn more? Go to http://www.arredondo.com/ Enter P072 in the search box to learn more about \"Well Visit, Ages 25 to 48: Care Instructions. \" Current as of: May 27, 2020               Content Version: 12.6 © 2006-2020 [employer]. Care instructions adapted under license by [employer] (which disclaims liability or warranty for this information). If you have questions about a medical condition or this instruction, always ask your healthcare professional. [address] any warranty or liability for your use of this information.

## 2020-10-22 ENCOUNTER — OFFICE VISIT (OUTPATIENT)
Dept: OBGYN CLINIC | Age: 45
End: 2020-10-22
Payer: COMMERCIAL

## 2020-10-22 VITALS — DIASTOLIC BLOOD PRESSURE: 73 MMHG | WEIGHT: 148 LBS | BODY MASS INDEX: 27.96 KG/M2 | SYSTOLIC BLOOD PRESSURE: 108 MMHG

## 2020-10-22 DIAGNOSIS — Z30.431 SURVEILLANCE OF (INTRAUTERINE) CONTRACEPTIVE DEVICE: ICD-10-CM

## 2020-10-22 DIAGNOSIS — Z01.419 ENCOUNTER FOR GYNECOLOGICAL EXAMINATION: Primary | ICD-10-CM

## 2020-10-22 DIAGNOSIS — N95.1 MENOPAUSAL STATE: ICD-10-CM

## 2020-10-22 DIAGNOSIS — Z12.4 PAP SMEAR FOR CERVICAL CANCER SCREENING: ICD-10-CM

## 2020-10-22 PROCEDURE — 77067 SCR MAMMO BI INCL CAD: CPT | Performed by: OBSTETRICS & GYNECOLOGY

## 2020-10-22 PROCEDURE — 99396 PREV VISIT EST AGE 40-64: CPT | Performed by: OBSTETRICS & GYNECOLOGY

## 2020-10-22 PROCEDURE — 77063 BREAST TOMOSYNTHESIS BI: CPT | Performed by: OBSTETRICS & GYNECOLOGY

## 2020-10-22 RX ORDER — ESTRADIOL 0.05 MG/D
1 FILM, EXTENDED RELEASE TRANSDERMAL
Qty: 24 PATCH | Refills: 3 | Status: SHIPPED | OUTPATIENT
Start: 2020-10-22 | End: 2021-10-01

## 2020-10-28 ENCOUNTER — OFFICE VISIT (OUTPATIENT)
Dept: INTERNAL MEDICINE CLINIC | Age: 45
End: 2020-10-28
Payer: COMMERCIAL

## 2020-10-28 VITALS
WEIGHT: 150.2 LBS | RESPIRATION RATE: 16 BRPM | DIASTOLIC BLOOD PRESSURE: 61 MMHG | TEMPERATURE: 96.9 F | OXYGEN SATURATION: 100 % | BODY MASS INDEX: 28.36 KG/M2 | HEIGHT: 61 IN | SYSTOLIC BLOOD PRESSURE: 102 MMHG | HEART RATE: 97 BPM

## 2020-10-28 DIAGNOSIS — E78.2 MIXED HYPERLIPIDEMIA: ICD-10-CM

## 2020-10-28 DIAGNOSIS — Z00.00 ROUTINE MEDICAL EXAM: Primary | ICD-10-CM

## 2020-10-28 DIAGNOSIS — I10 ESSENTIAL HYPERTENSION: ICD-10-CM

## 2020-10-28 PROCEDURE — 99396 PREV VISIT EST AGE 40-64: CPT | Performed by: FAMILY MEDICINE

## 2020-10-28 NOTE — PROGRESS NOTES
Coleen Pérez is a 41 y.o. female who presents for annual exam. Last seen by virtual visit in April. Treated for HTN.  Losartan 50mg once a day.  BP controlled.  No dizziness, headaches, or visual changes.   Active at home, walking 2-3 days a week. Weight 5# down from May 2019. Up to date on eye exam.      On singulair, claritin, astelin, and flonase. On immunotherapy for many years,  Sees Dr. Salena Matthew, allergy.  History of asthma. No albuterol.      On statin.  No myalgias.       Sees gyn, Dr. Latha Quintana.  Normal pap. Has IUD, Pinkey Shires. No menses. normal urination.      Annual mammogram.      Prior colonoscopy in 2017. Dr. Prisca Lopez. torutous colon. Otherwise normal.  Has constipation.  On miralax, once a day.       Has 3 toy poodles, lives with . 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               Past Medical History:   Diagnosis Date    Asthma     Dense breast tissue on mammogram 10/22/2020    BI-RADS 1: Negative    Environmental allergies     High cholesterol     Hx of mammogram 10/10/2019    Negative. No mammographic evidence of malignancy    Hypertension     IUD (intrauterine device) in place 03/16/2017    Pinkey Shires. Placed for endometrial protection with HRT.  MRSA (methicillin resistant staph aureus) culture positive 2010    vulvar    Premature menopause     Routine Papanicolaou smear 05/11/2016    Pap/HPV neg (media tab)       Family History   Problem Relation Age of Onset    Hypertension Mother     Cancer Father         NHL.      Hypertension Father     Asthma Sister        Social History     Socioeconomic History    Marital status:      Spouse name: Not on file    Number of children: Not on file    Years of education: Not on file    Highest education level: Not on file   Occupational History    Not on file   Social Needs    Financial resource strain: Not on file    Food insecurity     Worry: Not on file     Inability: Not on file   [employer] needs     Medical: Not on file     Non-medical: Not on file   Tobacco Use    Smoking status: Never Smoker    Smokeless tobacco: Never Used   Substance and Sexual Activity    Alcohol use: Yes     Alcohol/week: 1.0 standard drinks     Types: 1 Glasses of wine per week     Frequency: 2-4 times a month     Drinks per session: 1 or 2     Binge frequency: Weekly     Comment: occasional     Drug use: No    Sexual activity: Yes     Partners: Male     Birth control/protection: I.U.D. Lifestyle    Physical activity     Days per week: Not on file     Minutes per session: Not on file    Stress: Not on file   Relationships    Social connections     Talks on phone: Not on file     Gets together: Not on file     Attends [employer] service: Not on file     Active member of club or organization: Not on file     Attends meetings of clubs or organizations: Not on file     Relationship status: Not on file    Intimate partner violence     Fear of current or ex partner: Not on file     Emotionally abused: Not on file     Physically abused: Not on file     Forced sexual activity: Not on file   Other Topics Concern    Not on file   Social History Narrative    Not on file       Current Outpatient Medications on File Prior to Visit   Medication Sig Dispense Refill    multivitamin with minerals (HAIR,SKIN AND NAILS PO) Take  by mouth.  estradioL (VIVELLE) 0.05 mg/24 hr 1 Patch by TransDERmal route two (2) days a week. 24 Patch 3    atorvastatin (LIPITOR) 10 mg tablet TAKE 1 TAB BY MOUTH DAILY. INDICATIONS: STROKE PREVENTION 90 Tab 3    montelukast (SINGULAIR) 10 mg tablet TAKE 1 TABLET BY MOUTH EVERY DAY AT NIGHT 90 Tab 3    levonorgestrel (KYLEENA IU) by IntraUTERine route.  losartan (COZAAR) 50 mg tablet TAKE 1 TABLET BY MOUTH EVERY DAY 90 Tab 3    azelastine (ASTELIN) 137 mcg (0.1 %) nasal spray 1 [place] by Both Nostrils route two (2) times a day.  Use in each nostril as directed 3 Bottle 3    phenazopyridine HCl (AZO PO) Take  by mouth as needed.  loratadine (CLARITIN) 10 mg tablet Take 10 mg by mouth daily as needed.  polyethylene glycol (MIRALAX) 17 gram packet Take 1 Packet by mouth two (2) times a day. (Patient taking differently: Take 17 g by mouth daily. ) 1 Each 3    cholecalciferol, VITAMIN D3, (VITAMIN D3) 5,000 unit tab tablet Take 5,000 Units by mouth daily.  ascorbic acid, vitamin C, (VITAMIN C) 1,000 mg tablet Take 1,000 mg by mouth daily.  fluticasone (FLONASE) 50 mcg/Actuation nasal spray 2 Sprays by Both Nostrils route nightly. No current facility-administered medications on file prior to visit. Review of Systems  Pertinent items are noted in HPI. Objective:     Visit Vitals  /61 (BP 1 Location: Left arm, BP Patient Position: Sitting)   Pulse 97   Temp 96.9 °F (36.1 °C) (Temporal)   Resp 16   Ht 5' 1\" (1.549 m)   Wt 150 lb 3.2 oz (68.1 kg)   SpO2 100%   BMI 28.38 kg/m²     Gen: well appearing female  HEENT:   PERRL,normal conjunctiva. External ear and canals normal, TMs no opacification or erythema,  OP no erythema, no exudates, MMM  Neck:  Supple. Thyroid normal size, nontender, without nodules. No masses or LAD  Resp:  No wheezing, no rhonchi, no rales. CV:  RRR, normal S1S2, no murmur. GI: soft, nontender, without masses. No hepatosplenomegaly. Extrem:  +2 pulses, no edema, warm distally      Assessment/Plan:       ICD-10-CM ICD-9-CM    1. Routine medical exam  P15.87 G75.1 METABOLIC PANEL, COMPREHENSIVE      CBC W/O DIFF      HEMOGLOBIN A1C W/O EAG      TSH 3RD GENERATION      URINALYSIS W/ RFLX MICROSCOPIC   2. Mixed hyperlipidemia  E78.2 272.2    3. Essential hypertension  E00 177.3 METABOLIC PANEL, COMPREHENSIVE      CBC W/O DIFF      TSH 3RD GENERATION     Recommend heart healthy diet and regular cardiovascular exercise.      Follow-up and Dispositions    · Return for follow up pending labs and annual.  .         Kina Haji MD

## 2020-10-28 NOTE — PROGRESS NOTES
Reviewed record in preparation for visit and have obtained necessary documentation. Identified pt with two pt identifiers(name and ). Chief Complaint   Patient presents with    Complete Physical    Labs     Pt fasting       Health Maintenance Due   Topic Date Due    DTaP/Tdap/Td  (1 - Tdap) 1997    Cholesterol Test   2020    Yearly Flu Vaccine (1) 2020       Ms. Alka Cevallos has a reminder for a \"due or due soon\" health maintenance. I have asked that she discuss this further with her primary care provider for follow-up on this health maintenance. Coordination of Care Questionnaire:  :     1) Have you been to an emergency room, urgent care clinic since your last visit? no   Hospitalized since your last visit? no             2) Have you seen or consulted any other health care providers outside of [address] [place] [address] since your last visit? no  (Include any pap smears or colon screenings in this section.)    3) In the event something were to happen to you and you were unable to speak on your behalf, do you have an Advance Directive/ Living Will in place stating your wishes? NO    Do you have an Advance Directive on file? no    4) Are you interested in receiving information on Advance Directives? YES    Patient is accompanied by self I have received verbal consent from Linda Fox to discuss any/all medical information while they are present in the room.

## 2020-10-29 LAB
ALBUMIN SERPL-MCNC: 4.4 G/DL (ref 3.8–4.8)
ALBUMIN/GLOB SERPL: 1.8 {RATIO} (ref 1.2–2.2)
ALP SERPL-CCNC: 62 IU/L (ref 39–117)
ALT SERPL-CCNC: 45 IU/L (ref 0–32)
APPEARANCE UR: CLEAR
AST SERPL-CCNC: 28 IU/L (ref 0–40)
BILIRUB SERPL-MCNC: 1.3 MG/DL (ref 0–1.2)
BILIRUB UR QL STRIP: NEGATIVE
BUN SERPL-MCNC: 17 MG/DL (ref 6–24)
BUN/CREAT SERPL: 21 (ref 9–23)
CALCIUM SERPL-MCNC: 9.6 MG/DL (ref 8.7–10.2)
CHLORIDE SERPL-SCNC: 105 MMOL/L (ref 96–106)
CO2 SERPL-SCNC: 22 MMOL/L (ref 20–29)
COLOR UR: YELLOW
CREAT SERPL-MCNC: 0.81 MG/DL (ref 0.57–1)
ERYTHROCYTE [DISTWIDTH] IN BLOOD BY AUTOMATED COUNT: 12.1 % (ref 11.7–15.4)
GLOBULIN SER CALC-MCNC: 2.5 G/DL (ref 1.5–4.5)
GLUCOSE SERPL-MCNC: 89 MG/DL (ref 65–99)
GLUCOSE UR QL: NEGATIVE
HBA1C MFR BLD: 5 % (ref 4.8–5.6)
HCT VFR BLD AUTO: 41.3 % (ref 34–46.6)
HGB BLD-MCNC: 14.2 G/DL (ref 11.1–15.9)
HGB UR QL STRIP: NEGATIVE
KETONES UR QL STRIP: NEGATIVE
LEUKOCYTE ESTERASE UR QL STRIP: NEGATIVE
MCH RBC QN AUTO: 31.5 PG (ref 26.6–33)
MCHC RBC AUTO-ENTMCNC: 34.4 G/DL (ref 31.5–35.7)
MCV RBC AUTO: 92 FL (ref 79–97)
MICRO URNS: NORMAL
NITRITE UR QL STRIP: NEGATIVE
PH UR STRIP: 7 [PH] (ref 5–7.5)
PLATELET # BLD AUTO: 189 X10E3/UL (ref 150–450)
POTASSIUM SERPL-SCNC: 4.4 MMOL/L (ref 3.5–5.2)
PROT SERPL-MCNC: 6.9 G/DL (ref 6–8.5)
PROT UR QL STRIP: NEGATIVE
RBC # BLD AUTO: 4.51 X10E6/UL (ref 3.77–5.28)
SODIUM SERPL-SCNC: 141 MMOL/L (ref 134–144)
SP GR UR: 1.01 (ref 1–1.03)
TSH SERPL DL<=0.005 MIU/L-ACNC: 1.67 UIU/ML (ref 0.45–4.5)
UROBILINOGEN UR STRIP-MCNC: 0.2 MG/DL (ref 0.2–1)
WBC # BLD AUTO: 4.6 X10E3/UL (ref 3.4–10.8)

## 2020-11-01 LAB
CYTOLOGIST CVX/VAG CYTO: ABNORMAL
CYTOLOGY CVX/VAG DOC CYTO: ABNORMAL
CYTOLOGY CVX/VAG DOC THIN PREP: ABNORMAL
CYTOLOGY HISTORY:: ABNORMAL
DX ICD CODE: ABNORMAL
DX ICD CODE: ABNORMAL
HPV I/H RISK 4 DNA CVX QL PROBE+SIG AMP: NEGATIVE
Lab: ABNORMAL
OTHER STN SPEC: ABNORMAL
PATHOLOGIST CVX/VAG CYTO: ABNORMAL
STAT OF ADQ CVX/VAG CYTO-IMP: ABNORMAL

## 2020-11-02 ENCOUNTER — OFFICE VISIT (OUTPATIENT)
Dept: PRIMARY CARE CLINIC | Age: 45
End: 2020-11-02
Payer: COMMERCIAL

## 2020-11-02 VITALS
HEART RATE: 77 BPM | TEMPERATURE: 96.8 F | OXYGEN SATURATION: 98 % | RESPIRATION RATE: 16 BRPM | HEIGHT: 61 IN | DIASTOLIC BLOOD PRESSURE: 76 MMHG | WEIGHT: 152.6 LBS | SYSTOLIC BLOOD PRESSURE: 116 MMHG | BODY MASS INDEX: 28.81 KG/M2

## 2020-11-02 DIAGNOSIS — H00.022 HORDEOLUM INTERNUM OF RIGHT LOWER EYELID: Primary | ICD-10-CM

## 2020-11-02 PROCEDURE — 99212 OFFICE O/P EST SF 10 MIN: CPT | Performed by: NURSE PRACTITIONER

## 2020-11-02 RX ORDER — ERYTHROMYCIN 5 MG/G
0.2 OINTMENT OPHTHALMIC EVERY 6 HOURS
Qty: 1 TUBE | Refills: 0 | Status: SHIPPED | OUTPATIENT
Start: 2020-11-02 | End: 2022-04-04

## 2020-11-02 NOTE — PATIENT INSTRUCTIONS
Styes and Chalazia: Care Instructions Your Care Instructions Styes and chalazia (say \"civ-UWV-yom-uh\") are both conditions that can cause swelling of the eyelid. A stye is an infection in the root of an eyelash. The infection causes a tender red lump on the edge of the eyelid. The infection can spread until the whole eyelid becomes red and inflamed. Styes usually break open, and a tiny amount of pus drains. They usually clear up on their own in about a week, but they sometimes need treatment with antibiotics. A chalazion is a lump or cyst in the eyelid (chalazion is singular; chalazia is plural). It is caused by swelling and inflammation of deep oil glands inside the eyelid. Chalazia are usually not infected. They can take a few months to heal. 
If a chalazion becomes more swollen and painful or does not go away, you may need to have it drained by your doctor. Follow-up care is a key part of your treatment and safety. Be sure to make and go to all appointments, and call your doctor if you are having problems. It's also a good idea to know your test results and keep a list of the medicines you take. How can you care for yourself at home? · Do not rub your eyes. Do not squeeze or try to open a stye or chalazion. · To help a stye or chalazion heal faster: 
? Put a warm, moist compress on your eye for 5 to 10 minutes, 3 to 6 times a day. Heat often brings a stye to a point where it drains on its own. Keep in mind that warm compresses will often increase swelling a little at first. 
? Do not use hot water or heat a wet cloth in a microwave oven. The compress may get too hot and can burn the eyelid. · Always wash your hands before and after you use a compress or touch your eyes. · If the doctor gave you antibiotic drops or ointment, use the medicine exactly as directed. Use the medicine for as long as instructed, even if your eye starts to feel better. · To put in eyedrops or ointment: ? Tilt your head back, and pull your lower eyelid down with one finger. ? Drop or squirt the medicine inside the lower lid. ? Close your eye for 30 to 60 seconds to let the drops or ointment move around. ? Do not touch the ointment or dropper tip to your eyelashes or any other surface. · Do not wear eye makeup or contact lenses until the stye or chalazion heals. · Do not share towels, pillows, or washcloths while you have a stye. When should you call for help? Call your doctor now or seek immediate medical care if: 
  · You have pain in your eye.  
  · You have a change in vision or loss of vision.  
  · Redness and swelling get much worse. Watch closely for changes in your health, and be sure to contact your doctor if: 
  · Your stye does not get better in 1 week.  
  · Your chalazion does not start to get better after several weeks. Where can you learn more? Go to http://www.gray.com/ Enter W919 in the search box to learn more about \"Styes and Chalazia: Care Instructions. \" Current as of: December 18, 2019               Content Version: 12.6 © 5372-0134 [employer]. Care instructions adapted under license by [employer] (which disclaims liability or warranty for this information). If you have questions about a medical condition or this instruction, always ask your healthcare professional. [address] any warranty or liability for your use of this information.

## 2020-11-02 NOTE — PROGRESS NOTES
HISTORY OF PRESENT ILLNESS  Milagros Pitt is a 41 y.o. female. Patient reports right eye irritation x 4 days. She noted white drainage in the corner of her eye the other day and increased tearing. No erythema of sclera noted. No vision change. Mild discomfort noted at inner canthus. She has tried warm compresses with little relief. Visit Vitals  /76 (BP 1 Location: Left arm, BP Patient Position: Sitting)   Pulse 77   Temp 96.8 °F (36 °C) (Temporal)   Resp 16   Ht 5' 1\" (1.549 m)   Wt 152 lb 9.6 oz (69.2 kg)   SpO2 98%   BMI 28.83 kg/m²       HPI    Review of Systems   Eyes: Positive for discharge. Negative for blurred vision. Physical Exam  Constitutional:       Appearance: Normal appearance. Eyes:      General: Lids are normal.         Right eye: Hordeolum (internal hordeolum x 2 of lower lid) present. No discharge. Extraocular Movements: Extraocular movements intact. Conjunctiva/sclera: Conjunctivae normal.      Comments: Right upper eyelid inflammation at inner canthus   Neurological:      General: No focal deficit present. Mental Status: She is alert and oriented to person, place, and time. Psychiatric:         Mood and Affect: Mood normal.         Behavior: Behavior normal.         ASSESSMENT and PLAN    ICD-10-CM ICD-9-CM    1.  Hordeolum internum of right lower eyelid  H00.022 373.12      Orders Placed This Encounter    erythromycin (ILOTYCIN) ophthalmic ointment   continue warm compresses 4-6 times daily  Follow up with eye doctor if no improvement over the next few days

## 2020-11-02 NOTE — PROGRESS NOTES
HIPAA verified by two patient identifiers. Thelma Jules is a 41 y.o. female    Chief Complaint   Patient presents with    Eye Pain     eye has been burning and itching and some  discharge for 3 days. watery       Visit Vitals  /76 (BP 1 Location: Left arm, BP Patient Position: Sitting)   Pulse 77   Temp 96.8 °F (36 °C) (Temporal)   Resp 16   Ht 5' 1\" (1.549 m)   Wt 152 lb 9.6 oz (69.2 kg)   SpO2 98%   BMI 28.83 kg/m²       Pain Scale: 3/10  Pain Location: Eye      Health Maintenance Due   Topic Date Due    DTaP/Tdap/Td series (1 - Tdap) 07/26/1997    Lipid Screen  05/23/2020         Coordination of Care Questionnaire:  :   1) Have you been to an emergency room, urgent care, or hospitalized since your last visit? If yes, where when, and reason for visit? no       2. Have seen or consulted any other health care provider since your last visit? If yes, where when, and reason for visit? NO      Patient is accompanied by  I have received verbal consent from Thelma Jules to discuss any/all medical information while they are present in the room.

## 2020-11-03 PROBLEM — Z12.4 ROUTINE PAPANICOLAOU SMEAR: Status: ACTIVE | Noted: 2020-11-03

## 2021-03-31 DIAGNOSIS — I10 ESSENTIAL HYPERTENSION: ICD-10-CM

## 2021-03-31 RX ORDER — LOSARTAN POTASSIUM 50 MG/1
TABLET ORAL
Qty: 90 TAB | Refills: 1 | Status: SHIPPED | OUTPATIENT
Start: 2021-03-31 | End: 2021-09-30

## 2021-03-31 NOTE — TELEPHONE ENCOUNTER
Requested Prescriptions     Pending Prescriptions Disp Refills    losartan (COZAAR) 50 mg tablet 90 Tab 3     Sig: TAKE 1 TABLET BY MOUTH EVERY DAY     Future Appointments:  Future Appointments   Date Time Provider Dottie Mathias   10/28/2021  9:00 AM TIFFANIE SANCHEZ [facility]   10/28/2021  9:20 AM Boo HARE MD [facility]        Last Appointment With Me:  10/28/2020     Requested Prescriptions     Pending Prescriptions Disp Refills    losartan (COZAAR) 50 mg tablet 90 Tab 3     Sig: TAKE 1 TABLET BY MOUTH EVERY DAY

## 2021-07-01 DIAGNOSIS — E78.2 MIXED HYPERLIPIDEMIA: ICD-10-CM

## 2021-07-01 DIAGNOSIS — Z91.09 ENVIRONMENTAL ALLERGIES: ICD-10-CM

## 2021-07-01 RX ORDER — AZELASTINE 1 MG/ML
1 SPRAY, METERED NASAL 2 TIMES DAILY
Qty: 3 BOTTLE | Refills: 1 | Status: SHIPPED | OUTPATIENT
Start: 2021-07-01 | End: 2022-05-13

## 2021-07-01 RX ORDER — ATORVASTATIN CALCIUM 10 MG/1
10 TABLET, FILM COATED ORAL DAILY
Qty: 90 TABLET | Refills: 1 | Status: SHIPPED | OUTPATIENT
Start: 2021-07-01 | End: 2021-12-06 | Stop reason: SDUPTHER

## 2021-07-01 RX ORDER — MONTELUKAST SODIUM 10 MG/1
10 TABLET ORAL DAILY
Qty: 90 TABLET | Refills: 1 | Status: SHIPPED | OUTPATIENT
Start: 2021-07-01 | End: 2021-12-06 | Stop reason: SDUPTHER

## 2021-07-01 NOTE — TELEPHONE ENCOUNTER
Future Appointments:  Future Appointments   Date Time Provider Dottie Mey   10/28/2021  9:00 AM TIFFANIE SANCHEZ [facility]   10/28/2021  9:20 AM Karina HARE MD [facility]        Last Appointment With Me:  Visit date not found     Requested Prescriptions     Pending Prescriptions Disp Refills    azelastine (ASTELIN) 137 mcg (0.1 %) nasal spray 3 Bottle 3     [place] by Both Nostrils route two (2) times a day. Use in each nostril as directed    atorvastatin (LIPITOR) 10 mg tablet 90 Tablet 3     Sig: Take 1 Tablet by mouth daily.  Indications: stroke prevention    montelukast (SINGULAIR) 10 mg tablet 90 Tablet 3

## 2021-07-01 NOTE — TELEPHONE ENCOUNTER
Left message for patient concerning her refill request which was approved by Dr. Krystal Madrid.  Patient was also instructed to schedule an appointment in the month of October for her annual exam.

## 2021-08-19 LAB
CHOLEST SERPL-MCNC: 172 MG/DL
GLUCOSE SERPL-MCNC: 97 MG/DL (ref 65–100)
HDLC SERPL-MCNC: 43 MG/DL
LDLC SERPL CALC-MCNC: 105.8 MG/DL (ref 0–100)
TRIGL SERPL-MCNC: 116 MG/DL (ref ?–150)

## 2021-09-18 ENCOUNTER — OFFICE VISIT (OUTPATIENT)
Dept: URGENT CARE | Age: 46
End: 2021-09-18
Payer: COMMERCIAL

## 2021-09-18 DIAGNOSIS — Z20.822 ENCOUNTER FOR LABORATORY TESTING FOR COVID-19 VIRUS: Primary | ICD-10-CM

## 2021-09-18 PROCEDURE — 99211 OFF/OP EST MAY X REQ PHY/QHP: CPT | Performed by: FAMILY MEDICINE

## 2021-09-20 LAB
SARS-COV-2, NAA 2 DAY TAT: NORMAL
SARS-COV-2, NAA: NOT DETECTED

## 2021-09-30 DIAGNOSIS — I10 ESSENTIAL HYPERTENSION: ICD-10-CM

## 2021-09-30 RX ORDER — LOSARTAN POTASSIUM 50 MG/1
TABLET ORAL
Qty: 90 TABLET | Refills: 1 | Status: SHIPPED | OUTPATIENT
Start: 2021-09-30 | End: 2021-12-06 | Stop reason: SDUPTHER

## 2021-10-01 RX ORDER — ESTRADIOL 0.05 MG/D
PATCH, EXTENDED RELEASE TRANSDERMAL
Qty: 24 PATCH | Refills: 0 | Status: SHIPPED | OUTPATIENT
Start: 2021-10-01 | End: 2021-10-28 | Stop reason: ALTCHOICE

## 2021-10-01 NOTE — TELEPHONE ENCOUNTER
40year old patient last seen in the office on 10/22/2020       Patient has upcoming appointment on 10/28/2021                Prescription sent as per MD order to patient preferred pharmacy patient to her scheduled appointment

## 2021-10-27 NOTE — PROGRESS NOTES
Annual exam ages 40-58    [address] [place] [address] Street is a ,  40 y.o. female WHITE/NON- No LMP recorded. (Menstrual status: IUD). , who presents for her annual checkup. LV=10/22/20 AE    Since her last annual GYN exam about one year ago,  she has the following changes in her health history:   - covid vacs and booster    Premature menopause. Using vivelle patch and Devries Tyler IUD (3/2017). ADDITIONAL CONCERNS:  She is having no significant problems. With regard to the Gardasil vaccine, she is older than the age for which it is FDA approved. Menstrual status:    Her periods are nonexistent in flow. Contraception:    The current method of family planning is IUD. Sexual history:     reports being sexually active and has had partner(s) who are Male. She reports using the following method of birth control/protection: I.U.D..        Pap and Mammogram History:    Her most recent Pap smear was abnormal, HPV was neg, obtained 10/22/2020. She does have a history of abnormal pap smears. Last pap ASCUS hpv neg    The patient had her mammogram today in our office. Past Medical History:   Diagnosis Date    Asthma     Dense breast tissue on mammogram 10/22/2020    BI-RADS 1: Negative    Environmental allergies     High cholesterol     Hx of mammogram 10/10/2019    Negative. No mammographic evidence of malignancy    Hypertension     IUD (intrauterine device) in place 2017    Devries Jorge. Placed for endometrial protection with HRT.     MRSA (methicillin resistant staph aureus) culture positive     vulvar    Premature menopause     Routine Papanicolaou smear 2016    Pap/HPV neg (media tab)     Past Surgical History:   Procedure Laterality Date    COLONOSCOPY N/A 10/30/2017    COL[facility]  10/30/2017         HX COLONOSCOPY      HX HEENT      wisdom teeth     OB History    Para Term  AB Living 0 0 0 0 0 0   SAB TAB Ectopic Molar Multiple Live Births   0 0 0 0 0 0       Current Outpatient Medications   Medication Sig Dispense Refill    [START ON 10/29/2021] estradiol-norethindrone (CombiPatch) 0.05-0.14 mg/24 hr 1 Patch by TransDERmal route two (2) times a week. 24 Patch 4    losartan (COZAAR) 50 mg tablet TAKE 1 TABLET BY MOUTH ONE TIME A DAY 90 Tablet 1    azelastine (ASTELIN) 137 mcg (0.1 %) nasal spray 1 [place] by Both Nostrils route two (2) times a day. Use in each nostril as directed 3 Bottle 1    atorvastatin (LIPITOR) 10 mg tablet Take 1 Tablet by mouth daily. Indications: stroke prevention 90 Tablet 1    montelukast (SINGULAIR) 10 mg tablet Take 1 Tablet by mouth daily. 90 Tablet 1    erythromycin (ILOTYCIN) ophthalmic ointment Administer 0.2 g to right eye every six (6) hours. 1 Tube 0    multivitamin with minerals (HAIR,SKIN AND NAILS PO) Take  by mouth.  levonorgestrel (KYLEENA IU) by IntraUTERine route.  phenazopyridine HCl (AZO PO) Take  by mouth as needed.  loratadine (CLARITIN) 10 mg tablet Take 10 mg by mouth daily as needed.  polyethylene glycol (MIRALAX) 17 gram packet Take 1 Packet by mouth two (2) times a day. (Patient taking differently: Take 17 g by mouth daily. ) 1 Each 3    cholecalciferol, VITAMIN D3, (VITAMIN D3) 5,000 unit tab tablet Take 5,000 Units by mouth daily.  ascorbic acid, vitamin C, (VITAMIN C) 1,000 mg tablet Take 1,000 mg by mouth daily.  fluticasone (FLONASE) 50 mcg/Actuation nasal spray 2 Sprays by Both Nostrils route nightly.        Allergies: Bactrim [sulfamethoprim]   Social History     Socioeconomic History    Marital status:      Spouse name: Not on file    Number of children: Not on file    Years of education: Not on file    Highest education level: Not on file   Occupational History    Not on file   Tobacco Use    Smoking status: Never Smoker    Smokeless tobacco: Never Used   Substance and Sexual Activity  Alcohol use: Yes     Alcohol/week: 1.0 standard drinks     Types: 1 Glasses of wine per week     Comment: occasional     Drug use: No    Sexual activity: Yes     Partners: Male     Birth control/protection: I.U.D. Other Topics Concern    Not on file   Social History Narrative    Not on file     Social Determinants of Health     Financial Resource Strain:     Difficulty of Paying Living Expenses:    Food Insecurity:     Worried About Running Out of Food in the Last Year:     [address] in the Last Year:    Transportation Needs:     Lack of Transportation (Medical):  Lack of Transportation (Non-Medical):    Physical Activity:     Days of Exercise per Week:     Minutes of Exercise per Session:    Stress:     Feeling of Stress :    Social Connections:     Frequency of Communication with Friends and Family:     Frequency of Social Gatherings with Friends and Family:     Attends Mor[employer] Services:     Active Member of Clubs or Organizations:     Attends Club or Organization Meetings:     Marital Status:    Intimate Partner Violence:     Fear of Current or Ex-Partner:     Emotionally Abused:     Physically Abused:     Sexually Abused: Tobacco History:  reports that she has never smoked. She has never used smokeless tobacco.  Alcohol Abuse:  reports current alcohol use of about 1.0 standard drinks of alcohol per week. Drug Abuse:  reports no history of drug use. Patient Active Problem List   Diagnosis Code    Routine Papanicolaou smear Z12.4     Family History   Problem Relation Age of Onset    Hypertension Mother     Cancer Father         NHL.      Hypertension Father     Asthma Sister        Review of Systems - History obtained from the patient  Constitutional: negative for weight loss, fever, night sweats  HEENT: negative for hearing loss, earache, congestion, snoring, sorethroat  CV: negative for chest pain, palpitations, edema  Resp: negative for cough, shortness of breath, wheezing  GI: negative for change in bowel habits, abdominal pain, black or bloody stools  : negative for frequency, dysuria, hematuria, vaginal discharge  MSK: negative for back pain, joint pain, muscle pain  Breast: negative for breast lumps, nipple discharge, galactorrhea  Skin :negative for itching, rash, hives  Neuro: negative for dizziness, headache, confusion, weakness  Psych: negative for anxiety, depression, change in mood  Heme/lymph: negative for bleeding, bruising, pallor    Physical Exam    Visit Vitals  /77   Pulse 69   Wt 160 lb (72.6 kg)   BMI 30.23 kg/m²       Constitutional  · Appearance: well-nourished, well developed, alert, in no acute distress    HENT  · Head and Face: appears normal    Neck  · Inspection/Palpation: normal appearance, no masses or tenderness  · Lymph Nodes: no lymphadenopathy present  · Thyroid: gland size normal, nontender, no nodules or masses present on palpation    Chest  · Respiratory Effort: breathing unlabored  · Auscultation: normal breath sounds    Cardiovascular  · Heart:  · Auscultation: regular rate and rhythm without murmur    Breasts  · Inspection of Breasts: breasts symmetrical, no skin changes, no discharge present, nipple appearance normal, no skin retraction present  · Palpation of Breasts and Axillae: no masses present on palpation, no breast tenderness  · Axillary Lymph Nodes: no lymphadenopathy present    Gastrointestinal  · Abdominal Examination: abdomen non-tender to palpation, normal bowel sounds, no masses present  · Liver and spleen: no hepatomegaly present, spleen not palpable  · Hernias: no hernias identified    Genitourinary  · External Genitalia: normal appearance for age, no discharge present, no tenderness present, no inflammatory lesions present, no masses present, no atrophy present  · Vagina: normal vaginal vault without central or paravaginal defects, no discharge present, no inflammatory lesions present, no masses present  · Bladder: non-tender to palpation  · Urethra: appears normal  · Cervix: normal   · Uterus: normal size, shape and consistency  · Adnexa: no adnexal tenderness present, no adnexal masses present  · Perineum: perineum within normal limits, no evidence of trauma, no rashes or skin lesions present  · Anus: anus within normal limits, no hemorrhoids present  · Inguinal Lymph Nodes: no lymphadenopathy present    Skin  · General Inspection: no rash, no lesions identified    Neurologic/Psychiatric  · Mental Status:  · Orientation: grossly oriented to person, place and time  · Mood and Affect: mood normal, affect appropriate        Assessment & Plan:  · Routine gynecologic examination. Pap (10/22/20 ASCUS/HPV neg -> 3yrs     · Premature menopause. Vivelle patch and Coretha Rhett IUD (placed 3/2017). Will need to schedule Kyleena reinsertion NLT 3/2022 if she wishes to continue. Other options include cont patch plus PO Prometrium, or can try combipatch. Would like to try combipatch. eRX #[id number] RFx4. If happy with this, can schedule for kyleena removal at her convenience. · Her current medical status is satisfactory with no evidence of significant gynecologic issues. · Counseled re: diet, exercise, healthy lifestyle  · Return for yearly wellness visits  · Rec annual mammogram.  Done today in our office. · Patient verbalized understanding           Orders Placed This Encounter    estradiol-norethindrone (CombiPatch) 0.05-0.14 mg/24 hr     Si Patch by TransDERmal route two (2) times a week.      Dispense:  24 Patch     Refill:  4

## 2021-10-28 ENCOUNTER — OFFICE VISIT (OUTPATIENT)
Dept: OBGYN CLINIC | Age: 46
End: 2021-10-28

## 2021-10-28 VITALS
SYSTOLIC BLOOD PRESSURE: 117 MMHG | WEIGHT: 160 LBS | BODY MASS INDEX: 30.23 KG/M2 | HEART RATE: 69 BPM | DIASTOLIC BLOOD PRESSURE: 77 MMHG

## 2021-10-28 DIAGNOSIS — Z30.431 SURVEILLANCE OF (INTRAUTERINE) CONTRACEPTIVE DEVICE: ICD-10-CM

## 2021-10-28 DIAGNOSIS — Z01.419 ENCOUNTER FOR GYNECOLOGICAL EXAMINATION: Primary | ICD-10-CM

## 2021-10-28 DIAGNOSIS — Z79.890 MENOPAUSAL SYNDROME ON HORMONE REPLACEMENT THERAPY: ICD-10-CM

## 2021-10-28 DIAGNOSIS — N95.1 MENOPAUSAL SYNDROME ON HORMONE REPLACEMENT THERAPY: ICD-10-CM

## 2021-10-28 PROCEDURE — 99396 PREV VISIT EST AGE 40-64: CPT | Performed by: OBSTETRICS & GYNECOLOGY

## 2021-10-28 RX ORDER — ESTRADIOL/NORETHINDRONE ACETATE TRANSDERMAL SYSTEM .05; .14 MG/D; MG/D
1 PATCH, EXTENDED RELEASE TRANSDERMAL 2 TIMES WEEKLY
Qty: 24 PATCH | Refills: 4 | Status: SHIPPED | OUTPATIENT
Start: 2021-10-29 | End: 2021-11-29 | Stop reason: SDUPTHER

## 2021-11-15 ENCOUNTER — HOSPITAL ENCOUNTER (OUTPATIENT)
Dept: ULTRASOUND IMAGING | Age: 46
Discharge: HOME | End: 2021-11-15
Attending: FAMILY MEDICINE
Payer: COMMERCIAL

## 2021-11-15 ENCOUNTER — TELEPHONE (OUTPATIENT)
Dept: INTERNAL MEDICINE CLINIC | Age: 46
End: 2021-11-15

## 2021-11-15 DIAGNOSIS — R09.89 SUSPECTED DEEP VEIN THROMBOSIS (DVT): ICD-10-CM

## 2021-11-15 DIAGNOSIS — R09.89 SUSPECTED DEEP VEIN THROMBOSIS (DVT): Primary | ICD-10-CM

## 2021-11-15 PROCEDURE — 93971 EXTREMITY STUDY: CPT

## 2021-11-15 NOTE — TELEPHONE ENCOUNTER
Called patient as she has an imaging disc here , she said she would p/u at appt   Placed up front in envelope    When called patient reported an area to her LLE calf that was painful and discolored after trip to [place] [address] in July , symptoms started after the trip however pain is intermittent and swelling and discoloration subsided     V.o for stat doppler today   appt made for today at 430 pm mob 1 at [facility] to rule out dvt     Patient verbalized understanding     Em lpn

## 2021-11-16 ENCOUNTER — PATIENT MESSAGE (OUTPATIENT)
Dept: INTERNAL MEDICINE CLINIC | Age: 46
End: 2021-11-16

## 2021-11-29 RX ORDER — ESTRADIOL/NORETHINDRONE ACETATE TRANSDERMAL SYSTEM .05; .14 MG/D; MG/D
1 PATCH, EXTENDED RELEASE TRANSDERMAL 2 TIMES WEEKLY
Qty: 24 PATCH | Refills: 3 | Status: SHIPPED | OUTPATIENT
Start: 2021-11-29 | End: 2022-11-01 | Stop reason: SDUPTHER

## 2021-12-06 ENCOUNTER — OFFICE VISIT (OUTPATIENT)
Dept: INTERNAL MEDICINE CLINIC | Age: 46
End: 2021-12-06
Payer: COMMERCIAL

## 2021-12-06 VITALS
TEMPERATURE: 97.9 F | BODY MASS INDEX: 30.62 KG/M2 | HEIGHT: 61 IN | WEIGHT: 162.2 LBS | HEART RATE: 101 BPM | SYSTOLIC BLOOD PRESSURE: 129 MMHG | RESPIRATION RATE: 18 BRPM | OXYGEN SATURATION: 100 % | DIASTOLIC BLOOD PRESSURE: 80 MMHG

## 2021-12-06 DIAGNOSIS — E55.9 VITAMIN D DEFICIENCY: ICD-10-CM

## 2021-12-06 DIAGNOSIS — E78.2 MIXED HYPERLIPIDEMIA: ICD-10-CM

## 2021-12-06 DIAGNOSIS — I10 ESSENTIAL HYPERTENSION: ICD-10-CM

## 2021-12-06 DIAGNOSIS — Z91.09 ENVIRONMENTAL ALLERGIES: ICD-10-CM

## 2021-12-06 DIAGNOSIS — Z11.59 NEED FOR HEPATITIS C SCREENING TEST: ICD-10-CM

## 2021-12-06 DIAGNOSIS — Z00.00 ROUTINE MEDICAL EXAM: Primary | ICD-10-CM

## 2021-12-06 PROCEDURE — 99396 PREV VISIT EST AGE 40-64: CPT | Performed by: FAMILY MEDICINE

## 2021-12-06 RX ORDER — ATORVASTATIN CALCIUM 10 MG/1
10 TABLET, FILM COATED ORAL DAILY
Qty: 90 TABLET | Refills: 3 | Status: SHIPPED | OUTPATIENT
Start: 2021-12-06 | End: 2022-11-02 | Stop reason: SDUPTHER

## 2021-12-06 RX ORDER — MONTELUKAST SODIUM 10 MG/1
10 TABLET ORAL DAILY
Qty: 90 TABLET | Refills: 3 | Status: SHIPPED | OUTPATIENT
Start: 2021-12-06 | End: 2022-11-02 | Stop reason: SDUPTHER

## 2021-12-06 RX ORDER — LOSARTAN POTASSIUM 50 MG/1
TABLET ORAL
Qty: 90 TABLET | Refills: 3 | Status: SHIPPED | OUTPATIENT
Start: 2021-12-06

## 2021-12-06 NOTE — PROGRESS NOTES
London Kaufman is a 40 y.o. female who presents for annual exam    Patient reported left lower leg pain since trip to [facility] in July. Reports it was discolored and swollen. Did not call at that time. The nurse called her for another reason and she reported leg symptoms. US venous negative for DVT in November. No pain or discoloration. She feels a lump that has persisted. Unchanged in size. Denies trauma to area. Treated for HTN.  Losartan 50mg once a day.  BP controlled.  No dizziness, headaches, or visual changes.   Active at home, walking 2-3 days a week. no sx with exertion.      On singulair, claritin, astelin, and flonase. On immunotherapy for many years,  Sees Dr. Isaiah Clark, allergy.  History of asthma. No albuterol.      On statin.  No myalgias.       Sees gyn, Dr. Mayela Alvares.  Normal pap. Has IUD, Ryan Barrera. No menses. normal urination.      Annual mammogram Oct 2021.        Prior colonoscopy in 2017. Dr. Kathy Samano colon. Otherwise normal.  Has constipation.  On miralax, once a day.          Past Medical History:   Diagnosis Date    Asthma     Dense breast tissue on mammogram 10/22/2020    BI-RADS 1: Negative    Environmental allergies     High cholesterol     Hx of mammogram 10/28/2021    BI-RADS 1: Negative. No mammographic evidence of malignancy    Hypertension     IUD (intrauterine device) in place 03/16/2017    Ryan Barrera. Placed for endometrial protection with HRT.  MRSA (methicillin resistant staph aureus) culture positive 2010    vulvar    Premature menopause     Routine Papanicolaou smear 05/11/2016    Pap/HPV neg (media tab)       Family History   Problem Relation Age of Onset    Hypertension Mother     Cancer Father         NHL.      Hypertension Father     Asthma Sister        Social History     Socioeconomic History    Marital status:      Spouse name: Not on file    Number of children: Not on file    Years of education: Not on file    Highest education level: Not on file   Occupational History    Not on file   Tobacco Use    Smoking status: Never Smoker    Smokeless tobacco: Never Used   Substance and Sexual Activity    Alcohol use: Yes     Alcohol/week: 1.0 standard drink     Types: 1 Glasses of wine per week     Comment: occasional     Drug use: No    Sexual activity: Yes     Partners: Male     Birth control/protection: I.U.D. Other Topics Concern    Not on file   Social History Narrative    Not on file     Social Determinants of Health     Financial Resource Strain:     Difficulty of Paying Living Expenses: Not on file   Food Insecurity:     Worried About Running Out of Food in the Last Year: Not on file    Jovi of Food in the Last Year: Not on file   Transportation Needs:     Lack of Transportation (Medical): Not on file    Lack of Transportation (Non-Medical): Not on file   Physical Activity:     Days of Exercise per Week: Not on file    Minutes of Exercise per Session: Not on file   Stress:     Feeling of Stress : Not on file   Social Connections:     Frequency of Communication with Friends and Family: Not on file    Frequency of Social Gatherings with Friends and Family: Not on file    Attends Sikhism Services: Not on file    Active Member of [address] [place] [address] [employer] or Organizations: Not on file    Attends Club or Organization Meetings: Not on file    Marital Status: Not on file   Intimate Partner Violence:     Fear of Current or Ex-Partner: Not on file    Emotionally Abused: Not on file    Physically Abused: Not on file    Sexually Abused: Not on file   Housing Stability:     Unable to Pay for Housing in the Last Year: Not on file    Number of [place] in the Last Year: Not on file    Unstable Housing in the Last Year: Not on file       Current Outpatient Medications on File Prior to Visit   Medication Sig Dispense Refill    estradiol-norethindrone (CombiPatch) 0.05-0.14 mg/24 hr 1 Patch by TransDERmal route two (2) times a week.  24 Patch 3    azelastine (ASTELIN) 137 mcg (0.1 %) nasal spray 1 [place] by Both Nostrils route two (2) times a day. Use in each nostril as directed 3 Bottle 1    multivitamin with minerals (HAIR,SKIN AND NAILS PO) Take  by mouth.  levonorgestrel (KYLEENA IU) by IntraUTERine route.  phenazopyridine HCl (AZO PO) Take  by mouth as needed.  loratadine (CLARITIN) 10 mg tablet Take 10 mg by mouth daily as needed.  polyethylene glycol (MIRALAX) 17 gram packet Take 1 Packet by mouth two (2) times a day. (Patient taking differently: Take 17 g by mouth daily. ) 1 Each 3    cholecalciferol, VITAMIN D3, (VITAMIN D3) 5,000 unit tab tablet Take 5,000 Units by mouth daily.  ascorbic acid, vitamin C, (VITAMIN C) 1,000 mg tablet Take 1,000 mg by mouth daily.  fluticasone (FLONASE) 50 mcg/Actuation nasal spray 2 Sprays by Both Nostrils route nightly.  [DISCONTINUED] losartan (COZAAR) 50 mg tablet TAKE 1 TABLET BY MOUTH ONE TIME A DAY 90 Tablet 1    [DISCONTINUED] atorvastatin (LIPITOR) 10 mg tablet Take 1 Tablet by mouth daily. Indications: stroke prevention 90 Tablet 1    [DISCONTINUED] montelukast (SINGULAIR) 10 mg tablet Take 1 Tablet by mouth daily. 90 Tablet 1    erythromycin (ILOTYCIN) ophthalmic ointment Administer 0.2 g to right eye every six (6) hours. (Patient not taking: Reported on 12/6/2021) 1 Tube 0     No current facility-administered medications on file prior to visit. Review of Systems  Pertinent items are noted in HPI. Objective:     Visit Vitals  /80 (BP 1 Location: Left upper arm, BP Patient Position: Sitting)   Pulse (!) 101   Temp 97.9 °F (36.6 °C) (Temporal)   Resp 18   Ht 5' 1\" (1.549 m)   Wt 162 lb 3.2 oz (73.6 kg)   SpO2 100%   BMI 30.65 kg/m²     Gen: well appearing female  HEENT:   PERRL,normal conjunctiva. External ear and canals normal, TMs no opacification or erythema,  OP no erythema, no exudates, MMM  Neck:  Supple.  Thyroid normal size, nontender, without nodules. No masses or LAD  Resp:  No wheezing, no rhonchi, no rales. CV:  RRR, normal S1S2, no murmur. GI: soft, nontender, without masses. Extrem:  +2 pulses, no edema, warm distally, no lower leg mass appreciated, no bruising. Assessment/Plan:       ICD-10-CM ICD-9-CM    1. Routine medical exam  U48.03 O13.9 METABOLIC PANEL, COMPREHENSIVE      CBC W/O DIFF      LIPID PANEL      HEMOGLOBIN A1C WITH EAG      VITAMIN D, 25 HYDROXY      URINALYSIS W/ RFLX MICROSCOPIC      TSH 3RD GENERATION   2. Mixed hyperlipidemia  I16.0 044.1 METABOLIC PANEL, COMPREHENSIVE      LIPID PANEL      atorvastatin (LIPITOR) 10 mg tablet   3. Essential hypertension  Z77 710.5 METABOLIC PANEL, COMPREHENSIVE      CBC W/O DIFF      losartan (COZAAR) 50 mg tablet   4. Need for hepatitis C screening test  Z11.59 V73.89 HCV AB W/RFLX TO RUSS   5. Vitamin D deficiency  E55.9 268.9 VITAMIN D, 25 HYDROXY   6. Environmental allergies  Z91.09 V15.09 montelukast (SINGULAIR) 10 mg tablet   Recommend heart healthy diet and regular cardiovascular exercise.          Jojo Chin MD

## 2021-12-06 NOTE — PROGRESS NOTES
Identified patient with two patient identifiers- name and . Reviewed record in preparation for visit and have obtained necessary documentation. Chief Complaint   Patient presents with    Annual Wellness Visit     + doppler review- left leg. Health Maintenance Due   Topic    Hepatitis C Screening     DTaP/Tdap/Td series (1 - Tdap)    Lipid Screen     Flu Vaccine (1)        Visit Vitals  /80 (BP 1 Location: Left upper arm, BP Patient Position: Sitting)   Pulse (!) 101   Temp 97.9 °F (36.6 °C) (Temporal)   Resp 18   Ht 5' 1\" (1.549 m)   Wt 162 lb 3.2 oz (73.6 kg)   SpO2 100%   BMI 30.65 kg/m²       Pain Scale: /10    Coordination of Care Questionnaire:  :   1. Have you been to the ER, urgent care clinic since your last visit? Hospitalized since your last visit? No    2. Have you seen or consulted any other health care providers outside of the [address] [place] [address] since your last visit? Include any pap smears or colon screening.  No

## 2021-12-07 LAB
25(OH)D3 SERPL-MCNC: 71.3 NG/ML (ref 30–100)
ALBUMIN SERPL-MCNC: 4.6 G/DL (ref 3.5–5)
ALBUMIN/GLOB SERPL: 1.6 {RATIO} (ref 1.1–2.2)
ALP SERPL-CCNC: 60 U/L (ref 45–117)
ALT SERPL-CCNC: 44 U/L (ref 12–78)
ANION GAP SERPL CALC-SCNC: 7 MMOL/L (ref 5–15)
APPEARANCE UR: CLEAR
AST SERPL-CCNC: 17 U/L (ref 15–37)
BILIRUB SERPL-MCNC: 1.3 MG/DL (ref 0.2–1)
BILIRUB UR QL: NEGATIVE
BUN SERPL-MCNC: 15 MG/DL (ref 6–20)
BUN/CREAT SERPL: 21 (ref 12–20)
CALCIUM SERPL-MCNC: 9.2 MG/DL (ref 8.5–10.1)
CHLORIDE SERPL-SCNC: 106 MMOL/L (ref 97–108)
CHOLEST SERPL-MCNC: 196 MG/DL
CO2 SERPL-SCNC: 26 MMOL/L (ref 21–32)
COLOR UR: NORMAL
CREAT SERPL-MCNC: 0.7 MG/DL (ref 0.55–1.02)
ERYTHROCYTE [DISTWIDTH] IN BLOOD BY AUTOMATED COUNT: 12.4 % (ref 11.5–14.5)
EST. AVERAGE GLUCOSE BLD GHB EST-MCNC: 88 MG/DL
GLOBULIN SER CALC-MCNC: 2.8 G/DL (ref 2–4)
GLUCOSE SERPL-MCNC: 91 MG/DL (ref 65–100)
GLUCOSE UR STRIP.AUTO-MCNC: NEGATIVE MG/DL
HBA1C MFR BLD: 4.7 % (ref 4–5.6)
HCT VFR BLD AUTO: 42.8 % (ref 35–47)
HDLC SERPL-MCNC: 40 MG/DL
HDLC SERPL: 4.9 {RATIO} (ref 0–5)
HGB BLD-MCNC: 13.9 G/DL (ref 11.5–16)
HGB UR QL STRIP: NEGATIVE
KETONES UR QL STRIP.AUTO: NEGATIVE MG/DL
LDLC SERPL CALC-MCNC: 116.2 MG/DL (ref 0–100)
LEUKOCYTE ESTERASE UR QL STRIP.AUTO: NEGATIVE
MCH RBC QN AUTO: 31.2 PG (ref 26–34)
MCHC RBC AUTO-ENTMCNC: 32.5 G/DL (ref 30–36.5)
MCV RBC AUTO: 96.2 FL (ref 80–99)
NITRITE UR QL STRIP.AUTO: NEGATIVE
NRBC # BLD: 0 K/UL (ref 0–0.01)
NRBC BLD-RTO: 0 PER 100 WBC
PH UR STRIP: 6.5 [PH] (ref 5–8)
PLATELET # BLD AUTO: 224 K/UL (ref 150–400)
PMV BLD AUTO: 11.1 FL (ref 8.9–12.9)
POTASSIUM SERPL-SCNC: 3.6 MMOL/L (ref 3.5–5.1)
PROT SERPL-MCNC: 7.4 G/DL (ref 6.4–8.2)
PROT UR STRIP-MCNC: NEGATIVE MG/DL
RBC # BLD AUTO: 4.45 M/UL (ref 3.8–5.2)
SODIUM SERPL-SCNC: 139 MMOL/L (ref 136–145)
SP GR UR REFRACTOMETRY: 1.01 (ref 1–1.03)
TRIGL SERPL-MCNC: 199 MG/DL (ref ?–150)
TSH SERPL DL<=0.05 MIU/L-ACNC: 1.25 UIU/ML (ref 0.36–3.74)
UROBILINOGEN UR QL STRIP.AUTO: 0.2 EU/DL (ref 0.2–1)
VLDLC SERPL CALC-MCNC: 39.8 MG/DL
WBC # BLD AUTO: 5.7 K/UL (ref 3.6–11)

## 2021-12-08 LAB
HCV AB S/CO SERPL IA: <0.1 S/CO RATIO (ref 0–0.9)
HCV AB SERPL QL IA: NORMAL

## 2021-12-26 ENCOUNTER — PATIENT MESSAGE (OUTPATIENT)
Dept: INTERNAL MEDICINE CLINIC | Age: 46
End: 2021-12-26

## 2022-02-10 ENCOUNTER — OFFICE VISIT (OUTPATIENT)
Dept: OBGYN CLINIC | Age: 47
End: 2022-02-10
Payer: COMMERCIAL

## 2022-02-10 VITALS
WEIGHT: 165 LBS | HEART RATE: 92 BPM | DIASTOLIC BLOOD PRESSURE: 79 MMHG | BODY MASS INDEX: 31.18 KG/M2 | SYSTOLIC BLOOD PRESSURE: 127 MMHG

## 2022-02-10 DIAGNOSIS — Z30.432 ENCOUNTER FOR IUD REMOVAL: Primary | ICD-10-CM

## 2022-02-10 PROCEDURE — 58301 REMOVE INTRAUTERINE DEVICE: CPT | Performed by: OBSTETRICS & GYNECOLOGY

## 2022-02-10 NOTE — PROGRESS NOTES
[facility]  OFFICE PROCEDURE PROGRESS NOTE        Chart reviewed for the following:   Lowell Craven RN, have reviewed the History, Physical and updated the Allergic reactions for C/ [address] performed immediately prior to start of procedure:   Lowell Craven RN, have performed the following reviews on Ariana Fletcher prior to the start of the procedure:            * Patient was identified by name and date of birth   * Agreement on procedure being performed was verified  * Risks and Benefits explained to the patient  * Procedure site verified and marked as necessary  * Patient was positioned for comfort  * Consent was signed and verified     Time: 3510      Date of procedure: 2/10/2022    Procedure performed by:  Karma Velez MD    Provider assisted by: Tiesha Bynum RN    Patient assisted by: self    How tolerated by patient: tolerated the procedure well with no complications    Post Procedural Pain Scale: 2 - Hurts Little Bit    Comments: none      IUD REMOVAL    Indications for Removal:  Ariana Fletcher is a ,  40 y.o. female [address] [facility] whose No LMP recorded. (Menstrual status: IUD). was on . who presents today for IUD removal.  Her current IUD was placed 3/2017. On CombiPatch  She requests removal of the IUD because is expiring. The IUD removal procedure was discussed with the patient and she had no further questions. Procedure: The patient was placed in a dorsal lithotomy position. A speculum exam was performed and the cervix was visualized. The cervix was prepped with zephiran solution. The IUD string was visualized. Using ring forceps , the string was grasped and the IUD removed intact. The IUD was shown to the patient.

## 2022-03-19 PROBLEM — Z12.4 ROUTINE PAPANICOLAOU SMEAR: Status: ACTIVE | Noted: 2020-11-03

## 2022-04-04 ENCOUNTER — OFFICE VISIT (OUTPATIENT)
Dept: PRIMARY CARE CLINIC | Age: 47
End: 2022-04-04
Payer: COMMERCIAL

## 2022-04-04 VITALS
DIASTOLIC BLOOD PRESSURE: 85 MMHG | TEMPERATURE: 98 F | SYSTOLIC BLOOD PRESSURE: 146 MMHG | WEIGHT: 169.2 LBS | BODY MASS INDEX: 31.95 KG/M2 | HEART RATE: 108 BPM | OXYGEN SATURATION: 96 % | RESPIRATION RATE: 16 BRPM | HEIGHT: 61 IN

## 2022-04-04 DIAGNOSIS — K52.9 GASTROENTERITIS: ICD-10-CM

## 2022-04-04 DIAGNOSIS — A09 TRAVELER'S DIARRHEA: Primary | ICD-10-CM

## 2022-04-04 PROCEDURE — 99213 OFFICE O/P EST LOW 20 MIN: CPT | Performed by: NURSE PRACTITIONER

## 2022-04-04 RX ORDER — AZITHROMYCIN 500 MG/1
500 TABLET, FILM COATED ORAL DAILY
Qty: 3 TABLET | Refills: 0 | Status: SHIPPED | OUTPATIENT
Start: 2022-04-04 | End: 2022-04-07

## 2022-04-04 NOTE — PROGRESS NOTES
Subjective:      Patient : This patient is a 40 y.o. female with chief complaint of diarrhea. The symptoms began >24 hours ago (2 days) and have stayed. The symptoms were rapid  in onset. The patient states the stools have been very watery*. The stools are brown  The patient denies any fevers, nausea, or vomiting. Denies abdominal pain. Returned 4/1 from travel [place]. Her  who traveled with her is not having similar symptoms. The patient has not tried OTCs for relief of diarrhea at home for her symptoms. She rates her symtoms as moderate. Past Medical History:   Diagnosis Date    Asthma     Dense breast tissue on mammogram 10/22/2020    BI-RADS 1: Negative    Environmental allergies     High cholesterol     Hx of mammogram 10/28/2021    BI-RADS 1: Negative. No mammographic evidence of malignancy    Hypertension     IUD (intrauterine device) in place 03/16/2017    [facility]. Placed for endometrial protection with HRT.  MRSA (methicillin resistant staph aureus) culture positive 2010    vulvar    Premature menopause     Routine Papanicolaou smear 05/11/2016    Pap/HPV neg (media tab)     Past Surgical History:   Procedure Laterality Date    COLONOSCOPY N/A 10/30/2017    COL[facility] performed [facility]y [facility]ung [facility] at [facility] ENDOSCOPY    COLONOSCOPY,DIAGNOSTIC  10/30/2017         HX COLONOSCOPY      HX HEENT      wisdom teeth     Allergies   Allergen Reactions    Bactrim [Sulfamethoprim] Rash         Objective:     ROS:   No dyspnea or chest pain on exertion. No black, mucous or bloody stools. No urinary tract symptoms. . No neurological complaints. OBJECTIVE:   The patient appears well, alert, oriented x 3, in no distress.   Visit Vitals  BP (!) 146/85 (BP 1 Location: Left upper arm, BP Patient Position: Sitting, BP Cuff Size: Large adult)   Pulse (!) 108   Temp 98 °F (36.7 °C) (Skin)   Resp 16   Ht 5' 1\" (1.549 m)   Wt 169 lb 3.2 oz (76.7 kg)   LMP 03/01/2017   SpO2 96% BMI 31.97 kg/m²     HEENT:ENT normal.  Neck supple. OLIVER. Chest: Lungs are clear, good air entry, no wheezes, rhonchi or rales. Cardiovascular: S1 and S2 normal, no murmurs, regular rate and rhythm. Abdomen: soft without tenderness, guarding, mass or organomegaly. Extremities: show no edema, normal peripheral pulses. Neurological: is normal, no focal findings. Assessment/Plan:       ICD-10-CM ICD-9-CM    1. Traveler's diarrhea  A09 009.2      Orders Placed This Encounter    azithromycin (ZITHROMAX) 500 mg tab     BRAT diet then advance to bland diet, avoid dairy. Follow-up if no improvement.       Charlotte Moralez, NP

## 2022-04-04 NOTE — PATIENT INSTRUCTIONS
Traveler's Diarrhea: Care Instructions  Your Care Instructions     Traveler's diarrhea is loose, watery bowel movements you can get when you travel. It also can cause vomiting and belly cramps. This kind of diarrhea is usually caused by bacteria. But sometimes it is caused by a parasite or virus. Most people get it when they eat undercooked, raw, or contaminated foods. You can also get it if you drink contaminated water or if you drink something that has contaminated ice cubes in it. In some cases, new foods can cause diarrhea. In other cases, the stress and anxiety of travel can cause it. Traveler's diarrhea usually isn't serious. Most of the time, bowel movements return to normal quickly. The most important thing is to prevent dehydration. Make sure to drink a lot of fluids. Follow-up care is a key part of your treatment and safety. Be sure to make and go to all appointments, and call your doctor if you are having problems. It's also a good idea to know your test results and keep a list of the medicines you take. How can you care for yourself at home? · Watch for signs of dehydration. This means your body has lost too much water. Dehydration is serious and needs to be treated right away. Signs of dehydration are:  ? Feeling more thirsty than usual.  ? Dry eyes and mouth. ? Feeling faint or lightheaded. ? A smaller amount of urine than normal.  · To prevent dehydration, drink plenty of fluids. Choose bottled or boiled water and other clear liquids until you feel better. If you have kidney, heart, or liver disease and have to limit fluids, talk with your doctor before you increase the amount of fluids you drink. · Start to eat small amounts of mild foods if you feel like it. · Your doctor may recommend an over-the-counter medicine. These may include bismuth subsalicylate (Pepto-Bismol) or loperamide (Imodium). Read and follow all instructions on the label.  Do not use these medicines if your doctor does not recommend them. · Be safe with medicines. If your doctor recommends prescription medicine, take it as prescribed. Call your doctor if you think you are having a problem with your medicine. You will get more details on the specific medicines your doctor prescribes. · If your doctor prescribes antibiotics, take them as directed. Do not stop taking them just because you feel better. You need to take the full course of antibiotics. When should you call for help? Call 911 anytime you think you may need emergency care. For example, call if:    · You passed out (lost consciousness).     · Your stools are maroon or very bloody. Call your doctor now or seek immediate medical care if:    · You are dizzy or lightheaded, or you feel like you may faint.     · Your stools are black and look like tar, or they have streaks of blood.     · You have diarrhea and your belly pain or cramps are worse.     · You have signs of needing more fluids. You have sunken eyes, a dry mouth, and pass only a little urine. Watch closely for changes in your health, and be sure to contact your doctor if:    · You have 12 or more loose stools in 24 hours.     · You see pus in the diarrhea.     · You have a new or higher fever.     · Your diarrhea does not get better or is more frequent. Where can you learn more? Go to http://www.gray.com/  Enter L368 in the search box to learn more about \"Traveler's Diarrhea: Care Instructions. \"  Current as of: September 20, 2021               Content Version: 13.2  © 8189-7389 Healthwise, Incorporated. Care instructions adapted under license by [employer] (which disclaims liability or warranty for this information). If you have questions about a medical condition or this instruction, always ask your healthcare professional. [address] any warranty or liability for your use of this information.

## 2022-04-20 ENCOUNTER — OFFICE VISIT (OUTPATIENT)
Dept: PRIMARY CARE CLINIC | Age: 47
End: 2022-04-20
Payer: COMMERCIAL

## 2022-04-20 VITALS
HEART RATE: 87 BPM | WEIGHT: 170 LBS | DIASTOLIC BLOOD PRESSURE: 85 MMHG | SYSTOLIC BLOOD PRESSURE: 123 MMHG | TEMPERATURE: 98.3 F | RESPIRATION RATE: 16 BRPM | BODY MASS INDEX: 32.1 KG/M2 | HEIGHT: 61 IN

## 2022-04-20 DIAGNOSIS — L23.7 POISON IVY DERMATITIS: Primary | ICD-10-CM

## 2022-04-20 PROCEDURE — 99213 OFFICE O/P EST LOW 20 MIN: CPT | Performed by: NURSE PRACTITIONER

## 2022-04-20 RX ORDER — CETIRIZINE HCL 10 MG
5 TABLET ORAL
COMMUNITY

## 2022-04-20 RX ORDER — PREDNISONE 10 MG/1
TABLET ORAL
Qty: 30 TABLET | Refills: 0 | Status: SHIPPED | OUTPATIENT
Start: 2022-04-20 | End: 2022-05-31

## 2022-04-20 NOTE — PATIENT INSTRUCTIONS
Poison [place], [place], and [place]: Care Instructions  Overview     Poison ivy, poison oak, and poison sumac are plants that can cause a skin rash upon contact. The red, itchy rash often shows up in lines or streaks. It may cause fluid-filled blisters or large, raised hives. The rash is caused by an allergic reaction to an oil in these plants. The rash may occur when you touch the plant or when you touch objects that have come in contact with these plants. Common examples include clothing, pet fur, sporting gear, or gardening tools. You can't catch or spread the rash by touching the rash or the blister fluid. The plant oil will already have been absorbed or washed off the skin. The rash may seem to be spreading because it's still developing from earlier contact or because you have touched something that still has the plant oil on it. Follow-up care is a key part of your treatment and safety. Be sure to make and go to all appointments, and call your doctor if you are having problems. It's also a good idea to know your test results and keep a list of the medicines you take. How can you care for yourself at home? · If your doctor prescribed a cream, use it as directed. If your doctor prescribed medicine, take it exactly as prescribed. Call your doctor if you think you are having a problem with your medicine. · Use cold, wet cloths to reduce itching. · Take warm or cool baths with oatmeal bath products, such as Aveeno. · Keep cool, and stay out of the sun. · Leave the rash open to the air. · Wash all clothing or other things that may have come in contact with the plant oil. · Avoid most lotions and ointments until the rash heals. Calamine lotion may help relieve symptoms of a plant rash. Use it 3 or 4 times a day. To prevent exposure  If you know you will be working around poison ivy, oak, or sumac:  · Use a cream or lotion to help prevent the plant oil from getting on your skin.  These products are available over the counter. ? Apply the product less than 1 hour before contact with the plant, in a thick, complete layer. ? Wash it off thoroughly within 4 hours or as soon as possible after contact with plants. The product only delays the oil from getting into your skin. · Be sure to wash your hands before and after you use the restroom. When should you call for help? Call your doctor now or seek immediate medical care if:    · Your rash gets worse, and you start to feel bad and have a fever, a stiff neck, nausea, and vomiting.     · You have signs of infection, such as:  ? Increased pain, swelling, warmth, or redness. ? Red streaks leading from the rash. ? Pus draining from the rash. ? A fever. Watch closely for changes in your health, and be sure to contact your doctor if:    · You have new blisters or bruises, or the rash spreads and looks like a sunburn.     · The rash gets worse, or it comes back after nearly disappearing.     · You think a medicine you are using is making your rash worse.     · Your rash does not clear up after 1 to 2 weeks of home treatment.     · You have joint aches or body aches with your rash. Where can you learn more? Go to http://www.gray.com/  Enter L906 in the search box to learn more about \"Poison [place], [place], and [place]: Care Instructions. \"  Current as of: November 15, 2021               Content Version: 13.2  © 2656-3830 [employer]. Care instructions adapted under license by [employer] (which disclaims liability or warranty for this information). If you have questions about a medical condition or this instruction, always ask your healthcare professional. [place] [address] any warranty or liability for your use of this information.

## 2022-04-20 NOTE — PROGRESS NOTES
Chief Complaint   Patient presents with    Rash     Pt reports rash on arms, she thinks it is poision ivy. It started last Thursday.       Visit Vitals  /85   Pulse 87   Temp 98.3 °F (36.8 °C)   Resp 16   Ht 5' 1\" (1.549 m)   Wt 170 lb (77.1 kg)   BMI 32.12 kg/m²

## 2022-04-20 NOTE — PROGRESS NOTES
HISTORY OF PRESENT ILLNESS  Gurinder Reveles is a 40 y.o. female. Patient here with rash on bilateral forearms x 1 week. Has had poison before. Started on right forearm. Spread to left arm. Stated after working in yard. Has been using calamine lotion, Lesions not going away and it usually  needs a steroid  Has treated with calamine lotion. Cold shower. Zyrtec 1/2 tab. Past Medical History:   Diagnosis Date    Asthma     Dense breast tissue on mammogram 10/22/2020    BI-RADS 1: Negative    Environmental allergies     High cholesterol     Hx of mammogram 10/28/2021    BI-RADS 1: Negative. No mammographic evidence of malignancy    Hypertension     IUD (intrauterine device) in place 03/16/2017    [facility]. Placed for endometrial protection with HRT.  MRSA (methicillin resistant staph aureus) culture positive 2010    vulvar    Premature menopause     Routine Papanicolaou smear 05/11/2016    Pap/HPV neg (media tab)     Past Surgical History:   Procedure Laterality Date    COLONOSCOPY N/A 10/30/2017    [facility]  10/30/2017         HX COLONOSCOPY      HX HEENT      wisdom teeth       Review of Systems   Musculoskeletal: Negative for myalgias. Skin: Positive for itching and rash. Endo/Heme/Allergies: Negative for environmental allergies. Physical Exam  Vitals and nursing note reviewed. Constitutional:       Appearance: Normal appearance. HENT:      Head: Normocephalic and atraumatic. Eyes:      Pupils: Pupils are equal, round, and reactive to light. Cardiovascular:      Rate and Rhythm: Normal rate. Pulses: Normal pulses. Pulmonary:      Effort: Pulmonary effort is normal.   Musculoskeletal:      Cervical back: Normal range of motion. Skin:     Findings: Lesion present. Comments:  Vesicular Lesions present on medial and posterior forearm.      Neurological:      General: No focal deficit present. Mental Status: She is alert. Psychiatric:         Mood and Affect: Mood normal.         ASSESSMENT and PLAN    ICD-10-CM ICD-9-CM    1. Poison ivy dermatitis  L23.7 692.6      Encounter Diagnoses   Name Primary?  Poison ivy dermatitis Yes     Orders Placed This Encounter    cetirizine (ZyrTEC) 10 mg tablet    predniSONE (DELTASONE) 10 mg tablet   Medication as directed  Take with food  Continue calamine lotion and antihistamine. Call if lesions still present at end of taper.   Signed By: JENNIFER Shin     April 20, 2022

## 2022-05-13 RX ORDER — AZELASTINE 1 MG/ML
SPRAY, METERED NASAL
Qty: 3 EACH | Refills: 3 | Status: SHIPPED | OUTPATIENT
Start: 2022-05-13

## 2022-05-31 ENCOUNTER — OFFICE VISIT (OUTPATIENT)
Dept: PRIMARY CARE CLINIC | Age: 47
End: 2022-05-31
Payer: COMMERCIAL

## 2022-05-31 VITALS
DIASTOLIC BLOOD PRESSURE: 91 MMHG | RESPIRATION RATE: 16 BRPM | OXYGEN SATURATION: 98 % | SYSTOLIC BLOOD PRESSURE: 138 MMHG | BODY MASS INDEX: 31.91 KG/M2 | HEART RATE: 94 BPM | WEIGHT: 169 LBS | HEIGHT: 61 IN | TEMPERATURE: 98.2 F

## 2022-05-31 DIAGNOSIS — B02.9 HERPES ZOSTER WITHOUT COMPLICATION: Primary | ICD-10-CM

## 2022-05-31 PROCEDURE — 99213 OFFICE O/P EST LOW 20 MIN: CPT | Performed by: NURSE PRACTITIONER

## 2022-05-31 RX ORDER — VALACYCLOVIR HYDROCHLORIDE 1 G/1
1000 TABLET, FILM COATED ORAL 3 TIMES DAILY
Qty: 21 TABLET | Refills: 0 | Status: SHIPPED | OUTPATIENT
Start: 2022-05-31 | End: 2022-06-07

## 2022-05-31 NOTE — PROGRESS NOTES
Chief Complaint   Patient presents with    Skin Problem     Pt c/o irritation on her scalp. She got sunburned on Sunday and started with pain in her scalp yesterday.      Visit Vitals  BP (!) 138/91 (BP 1 Location: Right arm, BP Patient Position: Sitting)   Pulse 94   Temp 98.2 °F (36.8 °C) (Temporal)   Resp 16   Ht 5' 1\" (1.549 m)   Wt 169 lb (76.7 kg)   SpO2 98%   BMI 31.93 kg/m²

## 2022-05-31 NOTE — PROGRESS NOTES
HISTORY OF PRESENT ILLNESS  Louise Mo is a 40 y.o. female. HPI  Chief Complaint   Patient presents with    Skin Problem     Pt c/o irritation on her scalp. She got sunburned on Sunday and started with pain in her scalp yesterday. Pt presents with complaints of painful rash to scalp for 1 day. Scalp got sunburned 2 days ago. Yesterday she noticed tingling and irritation to her scalp. Rash is very painful and co-workers noticed some blisters. She's under a lot of stress at this time due to her mother's serious health concerns. Past Medical History:   Diagnosis Date    Asthma     Dense breast tissue on mammogram 10/22/2020    BI-RADS 1: Negative    Environmental allergies     High cholesterol     Hx of mammogram 10/28/2021    BI-RADS 1: Negative. No mammographic evidence of malignancy    Hypertension     IUD (intrauterine device) in place 03/16/2017    [facility]. Placed for endometrial protection with HRT.  MRSA (methicillin resistant staph aureus) culture positive 2010    vulvar    Premature menopause     Routine Papanicolaou smear 05/11/2016    Pap/HPV neg (media tab)     Past Surgical History:   Procedure Laterality Date    COLONOSCOPY N/A 10/30/2017    [facility] ENDOSCOPY    COLONOSCOPY,DIAGNOSTIC  10/30/2017         HX COLONOSCOPY      HX HEENT      wisdom teeth     Allergies   Allergen Reactions    Bactrim [Sulfamethoprim] Rash       Review of Systems   Skin: Positive for rash. All other systems reviewed and are negative. Physical Exam  Constitutional:       General: She is not in acute distress. Appearance: Normal appearance. She is not ill-appearing or toxic-appearing. HENT:      Head:        Comments: She has some hair loss to vertex of head at her par. Skin so scalp of this area is reddened, consistent with sunburn. There are a few vesicular lesions grouped to the vertex and to the right frontal scalp.   No open or weeping lesions. No lesions to face or elsewhere on body. Neurological:      Mental Status: She is alert. ASSESSMENT and PLAN    ICD-10-CM ICD-9-CM    1. Herpes zoster without complication  Y69.5 870.1      Orders Placed This Encounter    valACYclovir (VALTREX) 1 gram tablet     Per orders. OTC analgesics prn pain. May use Burow's solution as directed. 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 Follow-up if no improvement and prn.     Cholo Albarado, NP

## 2022-05-31 NOTE — PATIENT INSTRUCTIONS

## 2022-05-31 NOTE — LETTER
NOTIFICATION RETURN TO WORK / SCHOOL    5/31/2022 5:27 PM    [address]  [address] [phone]      To Whom It May Concern:    Segundo Adamson is currently under the care of Dinah Anguiano. She will return to work/school on 6/6/2022. If there are questions or concerns please have the patient contact our office.         Sincerely,      Ivana Segura NP

## 2022-06-01 ENCOUNTER — TELEPHONE (OUTPATIENT)
Dept: PRIMARY CARE CLINIC | Age: 47
End: 2022-06-01

## 2022-06-01 DIAGNOSIS — B02.29 POST HERPETIC NEURALGIA: Primary | ICD-10-CM

## 2022-06-01 RX ORDER — GABAPENTIN 300 MG/1
300 CAPSULE ORAL 3 TIMES DAILY
Qty: 30 CAPSULE | Refills: 0 | Status: SHIPPED | OUTPATIENT
Start: 2022-06-01 | End: 2022-06-11

## 2022-06-01 NOTE — TELEPHONE ENCOUNTER
Pt calls with pins and needles pain to scalp. Very uncomfortable. Taking ibuprofen with minimal relief. Will send in gabapentin. Start with one tab once a day, then one tab bid on day 2, then one tab tid on day 3 and continue dose. Pt to follow-up with her PCP next week. Received leave forms for FMLA. Will complete these and leave at  for pick-up.

## 2022-06-09 ENCOUNTER — OFFICE VISIT (OUTPATIENT)
Dept: INTERNAL MEDICINE CLINIC | Age: 47
End: 2022-06-09
Payer: COMMERCIAL

## 2022-06-09 VITALS
RESPIRATION RATE: 16 BRPM | DIASTOLIC BLOOD PRESSURE: 80 MMHG | SYSTOLIC BLOOD PRESSURE: 123 MMHG | OXYGEN SATURATION: 100 % | WEIGHT: 167.4 LBS | TEMPERATURE: 95.6 F | HEART RATE: 102 BPM | HEIGHT: 61 IN | BODY MASS INDEX: 31.6 KG/M2

## 2022-06-09 DIAGNOSIS — E78.2 MIXED HYPERLIPIDEMIA: ICD-10-CM

## 2022-06-09 DIAGNOSIS — I10 ESSENTIAL HYPERTENSION: Primary | ICD-10-CM

## 2022-06-09 DIAGNOSIS — E66.9 CLASS 1 OBESITY WITHOUT SERIOUS COMORBIDITY WITH BODY MASS INDEX (BMI) OF 31.0 TO 31.9 IN ADULT, UNSPECIFIED OBESITY TYPE: ICD-10-CM

## 2022-06-09 PROCEDURE — 99214 OFFICE O/P EST MOD 30 MIN: CPT | Performed by: FAMILY MEDICINE

## 2022-06-09 RX ORDER — POLYETHYLENE GLYCOL 3350 17 G/17G
17 POWDER, FOR SOLUTION ORAL DAILY
COMMUNITY

## 2022-06-09 NOTE — PROGRESS NOTES
Saintclair Lev is a 40 y.o. female who presents for follow up. Recent shingles, left ophthalmic branch, no eye involvement. Started with tightness in left scalp, \"felt sunburned\". Took valtrex and gabapentin. Treated for HTN.  Losartan 50mg once a day.  BP controlled.  No dizziness, headaches, or visual changes.   Active at home, walking 2-3 days a week. no sx with exertion.      On singulair, claritin, astelin, and flonase. On immunotherapy for many years,  Sees Dr. Marjorie Amin, allergy.  History of asthma. No albuterol.      On statin.  No myalgias.  LDL  At goal.       Sees gyn, Dr. Buddy Vidales.  Normal pap. IUD removed. No menses, early menopausal. On combipatch 2 times a week. normal urination.          Annual 3Dmammogram Oct 2021.        Prior colonoscopy in 2017. Dr. Santana Fuentes colon. Otherwise normal.  Has constipation.  On miralax, once a day.  MGM with colon cancer. Up to date on eye and dental.     Working on weight loss, using street strider.               Past Medical History:   Diagnosis Date    Asthma     Dense breast tissue on mammogram 10/22/2020    BI-RADS 1: Negative    Environmental allergies     High cholesterol     Hx of mammogram 10/28/2021    BI-RADS 1: Negative. No mammographic evidence of malignancy    Hypertension     IUD (intrauterine device) in place 03/16/2017    [facility]. Placed for endometrial protection with HRT.  MRSA (methicillin resistant staph aureus) culture positive 2010    vulvar    Premature menopause     Routine Papanicolaou smear 05/11/2016    Pap/HPV neg (media tab)       Family History   Problem Relation Age of Onset    Hypertension Mother     Cancer Father         NHL.      Hypertension Father     Asthma Sister        Social History     Socioeconomic History    Marital status:      Spouse name: Not on file    Number of children: Not on file    Years of education: Not on file    Highest education level: Not on file   Occupational History    Not on file   Tobacco Use    Smoking status: Never Smoker    Smokeless tobacco: Never Used   Vaping Use    Vaping Use: Never used   Substance and Sexual Activity    Alcohol use: Yes     Alcohol/week: 1.0 standard drink     Types: 1 Glasses of wine per week     Comment: occasional     Drug use: No    Sexual activity: Yes     Partners: Male     Birth control/protection: I.U.D. Other Topics Concern    Not on file   Social History Narrative    Not on file     Social Determinants of Health     Financial Resource Strain:     Difficulty of Paying Living Expenses: Not on file   Food Insecurity:     Worried About Running Out of Food in the Last Year: Not on file    Jovi of Food in the Last Year: Not on file   Transportation Needs:     Lack of Transportation (Medical): Not on file    Lack of Transportation (Non-Medical): Not on file   Physical Activity:     Days of Exercise per Week: Not on file    Minutes of Exercise per Session: Not on file   Stress:     Feeling of Stress : Not on file   Social Connections:     Frequency of Communication with Friends and Family: Not on file    Frequency of Social Gatherings with Friends and Family: Not on file    Attends Synagogue Services: Not on file    Active Member of [address] [place] [address] [employer] or Organizations: Not on file    Attends Club or Organization Meetings: Not on file    Marital Status: Not on file   Intimate Partner Violence:     Fear of Current or Ex-Partner: Not on file    Emotionally Abused: Not on file    Physically Abused: Not on file    Sexually Abused: Not on file   Housing Stability:     Unable to Pay for Housing in the Last Year: Not on file    Number of [place] in the Last Year: Not on file    Unstable Housing in the Last Year: Not on file       Current Outpatient Medications on File Prior to Visit   Medication Sig Dispense Refill    polyethylene glycol (Miralax) 17 gram/dose powder Take 17 g by mouth daily.       gabapentin (NEURONTIN) 300 mg capsule Take 1 Capsule by mouth three (3) times daily for 10 days. Max Daily Amount: 900 mg. 30 Capsule 0    azelastine (ASTELIN) 137 mcg (0.1 %) nasal spray USE 1 SPRAY IN EACH NOSTRIL TWO TIMES A DAY. USE IN EACH NOSTRIL AS DIRECTED. 3 Each 3    cetirizine (ZyrTEC) 10 mg tablet Take 5 mg by mouth.  losartan (COZAAR) 50 mg tablet TAKE 1 TABLET BY MOUTH ONE TIME A DAY 90 Tablet 3    atorvastatin (LIPITOR) 10 mg tablet Take 1 Tablet by mouth daily. Indications: stroke prevention 90 Tablet 3    montelukast (SINGULAIR) 10 mg tablet Take 1 Tablet by mouth daily. 90 Tablet 3    estradiol-norethindrone (CombiPatch) 0.05-0.14 mg/24 hr 1 Patch by TransDERmal route two (2) times a week. 24 Patch 3    cholecalciferol, VITAMIN D3, (VITAMIN D3) 5,000 unit tab tablet Take 5,000 Units by mouth daily.  ascorbic acid, vitamin C, (VITAMIN C) 1,000 mg tablet Take 1,000 mg by mouth daily.  fluticasone (FLONASE) 50 mcg/Actuation nasal spray 2 Sprays by Both Nostrils route nightly.  [DISCONTINUED] multivitamin with minerals (HAIR,SKIN AND NAILS PO) Take  by mouth. (Patient not taking: Reported on 5/31/2022)       No current facility-administered medications on file prior to visit. Review of Systems  Pertinent items are noted in HPI. Objective:     Visit Vitals  /80 (BP 1 Location: Left upper arm, BP Patient Position: Sitting, BP Cuff Size: Adult)   Pulse (!) 102   Temp (!) 95.6 °F (35.3 °C) (Temporal)   Resp 16   Ht 5' 1\" (1.549 m)   Wt 167 lb 6.4 oz (75.9 kg)   LMP 03/01/2017   SpO2 100%   BMI 31.63 kg/m²     Gen: well appearing female  HEENT:   PERRL,normal conjunctiva. External ear and canals normal, TMs no opacification or erythema,  OP no erythema, no exudates, MMM  Neck:  Supple. Thyroid normal size, nontender, without nodules. No masses or LAD  Resp:  No wheezing, no rhonchi, no rales. CV:  RRR, normal S1S2, no murmur. GI: soft, nontender, without masses.   No hepatosplenomegaly. Extrem:  +2 pulses, no edema, warm distally      Assessment/Plan:       ICD-10-CM ICD-9-CM    1. Essential hypertension  I10 401.9    2. Mixed hyperlipidemia  E78.2 272.2    Recommend heart healthy diet and regular cardiovascular exercise. Continue to work on weight reduction    Follow-up and Dispositions    · Return in about 6 months (around 12/9/2022) for annual exam and fasting labs in Dec 2021.   Anirudh Saunders MD

## 2022-06-09 NOTE — PATIENT INSTRUCTIONS
Starting a Weight Loss Plan: Care Instructions  Overview     If you're thinking about losing weight, it can be hard to know where to start. Your doctor can help you set up a weight loss plan that best meets your needs. You may want to take a class on nutrition or exercise, or you could join a weight loss support group. If you have questions about how to make changes to your eating or exercise habits, ask your doctor about seeing a registered dietitian or an exercise specialist.  It can be a big challenge to lose weight. But you don't have to make huge changes at once. Make small changes, and stick with them. When those changes become habit, add a few more changes. If you don't think you're ready to make changes right now, try to pick a date in the future. Make an appointment to see your doctor to discuss whether the time is right for you to start a plan. Follow-up care is a key part of your treatment and safety. Be sure to make and go to all appointments, and call your doctor if you are having problems. It's also a good idea to know your test results and keep a list of the medicines you take. How can you care for yourself at home? · Set realistic goals. Many people expect to lose much more weight than is likely. A weight loss of 5% to 10% of your body weight may be enough to improve your health. · Get family and friends involved to provide support. Talk to them about why you are trying to lose weight, and ask them to help. They can help by participating in exercise and having meals with you, even if they may be eating something different. · Find what works best for you. If you do not have time or do not like to cook, a program that offers meal replacement bars or shakes may be better for you. Or if you like to prepare meals, finding a plan that includes daily menus and recipes may be best.  · Ask your doctor about other health professionals who can help you achieve your weight loss goals.   ? A dietitian can help you make healthy changes in your diet. ? An exercise specialist or  can help you develop a safe and effective exercise program.  ? A counselor or psychiatrist can help you cope with issues such as depression, anxiety, or family problems that can make it hard to focus on weight loss. · Consider joining a support group for people who are trying to lose weight. Your doctor can suggest groups in your area. Where can you learn more? Go to http://www.gray.com/  Enter U357 in the search box to learn more about \"Starting a Weight Loss Plan: Care Instructions. \"  Current as of: December 27, 2021               Content Version: 13.2  © 9380-3332 Healthwise, [employer]. Care instructions adapted under license by [employer] (which disclaims liability or warranty for this information). If you have questions about a medical condition or this instruction, always ask your healthcare professional. [address] any warranty or liability for your use of this information.

## 2022-06-09 NOTE — PROGRESS NOTES
1. \"Have you been to the ER, urgent care clinic since your last visit? Hospitalized since your last visit? \" No    2. \"Have you seen or consulted any other health care providers outside of the [address] [place] [address] since your last visit? \" No     3. For patients aged 39-70: Has the patient had a colonoscopy / FIT/ Cologuard? Yes - no Care Gap present      If the patient is female:    4. For patients aged 41-77: Has the patient had a mammogram within the past 2 years? Yes - no Care Gap present      5. For patients aged 21-65: Has the patient had a pap smear?  Yes - no Care Gap present

## 2022-06-26 ENCOUNTER — OFFICE VISIT (OUTPATIENT)
Dept: URGENT CARE | Age: 47
End: 2022-06-26
Payer: COMMERCIAL

## 2022-06-26 VITALS
TEMPERATURE: 98.6 F | OXYGEN SATURATION: 98 % | HEIGHT: 61 IN | HEART RATE: 88 BPM | DIASTOLIC BLOOD PRESSURE: 84 MMHG | SYSTOLIC BLOOD PRESSURE: 123 MMHG | WEIGHT: 167 LBS | RESPIRATION RATE: 16 BRPM | BODY MASS INDEX: 31.53 KG/M2

## 2022-06-26 DIAGNOSIS — B02.9 HERPES ZOSTER WITHOUT COMPLICATION: Primary | ICD-10-CM

## 2022-06-26 DIAGNOSIS — M79.2 NEURALGIC PAIN: ICD-10-CM

## 2022-06-26 PROCEDURE — 99213 OFFICE O/P EST LOW 20 MIN: CPT | Performed by: FAMILY MEDICINE

## 2022-06-26 RX ORDER — VALACYCLOVIR HYDROCHLORIDE 1 G/1
1000 TABLET, FILM COATED ORAL 3 TIMES DAILY
Qty: 30 TABLET | Refills: 1 | Status: SHIPPED | OUTPATIENT
Start: 2022-06-26

## 2022-06-26 RX ORDER — GABAPENTIN 100 MG/1
100 CAPSULE ORAL
Qty: 30 CAPSULE | Refills: 0 | Status: SHIPPED | OUTPATIENT
Start: 2022-06-26

## 2022-06-26 NOTE — PROGRESS NOTES
Rash   The history is provided by the patient. This is a recurrent problem. The current episode started yesterday. The problem has been rapidly worsening. Associated with: shingles. There has been no fever. The rash is present on the scalp. The pain is at a severity of 7/10. The pain is moderate. Associated symptoms include blisters, itching and pain. She has tried nothing for the symptoms. Past Medical History:   Diagnosis Date    Asthma     Dense breast tissue on mammogram 10/22/2020    BI-RADS 1: Negative    Environmental allergies     High cholesterol     Hx of mammogram 10/28/2021    BI-RADS 1: Negative. No mammographic evidence of malignancy    Hypertension     IUD (intrauterine device) in place 03/16/2017    [facility]. Placed for endometrial protection with HRT.  MRSA (methicillin resistant staph aureus) culture positive 2010    vulvar    Premature menopause     Routine Papanicolaou smear 05/11/2016    Pap/HPV neg (media tab)        Past Surgical History:   Procedure Laterality Date    COLONOSCOPY N/A 10/30/2017    COLONOSCOPY performed by Saul Landaverde MD at [facility] ENDOSCOPY    COLONOSCOPY,DIAGNOSTIC  10/30/2017         HX COLONOSCOPY      HX HEENT      wisdom teeth         Family History   Problem Relation Age of Onset    Hypertension Mother     Cancer Father         NHL.  Hypertension Father     Asthma Sister         Social History     Socioeconomic History    Marital status:      Spouse name: Not on file    Number of children: Not on file    Years of education: Not on file    Highest education level: Not on file   Occupational History    Not on file   Tobacco Use    Smoking status: Never Smoker    Smokeless tobacco: Never Used   Vaping Use    Vaping Use: Never used   Substance and Sexual Activity    Alcohol use:  Yes     Alcohol/week: 1.0 standard drink     Types: 1 Glasses of wine per week     Comment: occasional     Drug use: No    Sexual activity: Yes Partners: Male     Birth control/protection: I.U.D. Other Topics Concern    Not on file   Social History Narrative    Not on file     Social Determinants of Health     Financial Resource Strain:     Difficulty of Paying Living Expenses: Not on file   Food Insecurity:     Worried About Running Out of Food in the Last Year: Not on file    Jovi of Food in the Last Year: Not on file   Transportation Needs:     Lack of Transportation (Medical): Not on file    Lack of Transportation (Non-Medical): Not on file   Physical Activity:     Days of Exercise per Week: Not on file    Minutes of Exercise per Session: Not on file   Stress:     Feeling of Stress : Not on file   Social Connections:     Frequency of Communication with Friends and Family: Not on file    Frequency of Social Gatherings with Friends and Family: Not on file    Attends Yazidism Services: Not on file    Active Member of [address] [place] [address] [employer] or Organizations: Not on file    Attends Club or Organization Meetings: Not on file    Marital Status: Not on file   Intimate Partner Violence:     Fear of Current or Ex-Partner: Not on file    Emotionally Abused: Not on file    Physically Abused: Not on file    Sexually Abused: Not on file   Housing Stability:     Unable to Pay for Housing in the Last Year: Not on file    Number of [place] in the Last Year: Not on file    Unstable Housing in the Last Year: Not on file                ALLERGIES: Bactrim [sulfamethoprim]    Review of Systems   Skin: Positive for itching and rash. All other systems reviewed and are negative. Vitals:    06/26/22 0951   BP: 123/84   Pulse: 88   Resp: 16   Temp: 98.6 °F (37 °C)   SpO2: 98%   Weight: 167 lb (75.8 kg)   Height: 5' 1\" (1.549 m)       Physical Exam  Vitals and nursing note reviewed. Constitutional:       General: She is not in acute distress. Skin:     Findings: Rash (mid scalp - c/w shigles ) present. Rash is pustular and vesicular. MDM    Procedures      ICD-10-CM ICD-9-CM    1. Herpes zoster without complication  M50.6 227.2 gabapentin (NEURONTIN) 100 mg capsule   2. 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 Dispense:  30 Tablet     Refill:  1    gabapentin (NEURONTIN) 100 mg capsule     Sig: Take 1 Capsule by mouth three (3) times daily as needed for Pain. Max Daily Amount: 300 mg. Dispense:  30 Capsule     Refill:  0     No results found for any visits on 06/26/22. The patients condition was discussed with the patient and they understand. The patient is to follow up with primary care doctor. If signs and symptoms become worse the pt is to go to the ER. The patient is to take medications as prescribed.

## 2022-06-26 NOTE — LETTER
NOTIFICATION RETURN TO WORK / SCHOOL    6/26/2022 10:07 AM    Ms. Zeinab Argueta [address]  [address] [phone]      To Whom It May Concern:    Cassie Sheldon is currently under the care of [facility]. She will return to work from 7/2/22 onwards. If there are questions or concerns please have the patient contact our office.         Sincerely,      Selene Diallo MD

## 2022-06-26 NOTE — PATIENT INSTRUCTIONS

## 2022-06-28 ENCOUNTER — OFFICE VISIT (OUTPATIENT)
Dept: PRIMARY CARE CLINIC | Age: 47
End: 2022-06-28
Payer: COMMERCIAL

## 2022-06-28 VITALS
WEIGHT: 167 LBS | DIASTOLIC BLOOD PRESSURE: 83 MMHG | RESPIRATION RATE: 18 BRPM | BODY MASS INDEX: 31.53 KG/M2 | SYSTOLIC BLOOD PRESSURE: 118 MMHG | HEART RATE: 87 BPM | HEIGHT: 61 IN | OXYGEN SATURATION: 99 % | TEMPERATURE: 97.5 F

## 2022-06-28 DIAGNOSIS — B02.9 HERPES ZOSTER WITHOUT COMPLICATION: Primary | ICD-10-CM

## 2022-06-28 DIAGNOSIS — M79.2 NEURALGIC PAIN: ICD-10-CM

## 2022-06-28 PROCEDURE — 99213 OFFICE O/P EST LOW 20 MIN: CPT | Performed by: NURSE PRACTITIONER

## 2022-06-28 NOTE — PATIENT INSTRUCTIONS

## 2022-06-28 NOTE — PROGRESS NOTES
HISTORY OF PRESENT ILLNESS  Beatrice Hooper is a 40 y.o. female. HPI  Chief Complaint   Patient presents with    Letter for School/Work     Pt was on FMLA and is ready to return to work. Pt presents for RTW note from shingles. Pt was seen at urgent care on 6/26 for shingles. She immediately started on valtrex when she noticed her scalp tingling and continues to take valtrex. Pain is very well controlled with gabapentin. She is also using domeboro solution which is helping. Blisters have scabbed over and she would like to return to work asap. Works as nurse in hospital.  Pt also had shingles 5/31. Had PCP f/u visit 6/9. Notes in chart reviewed. Pt also recently sent message to PCP regarding shingles reoccurrence and shingles vaccine. Past Medical History:   Diagnosis Date    Asthma     Dense breast tissue on mammogram 10/22/2020    BI-RADS 1: Negative    Environmental allergies     High cholesterol     Hx of mammogram 10/28/2021    BI-RADS 1: Negative. No mammographic evidence of malignancy    Hypertension     IUD (intrauterine device) in place 03/16/2017    [facility] Placed for endometrial protection with HRT.  MRSA (methicillin resistant staph aureus) culture positive 2010    vulvar    Premature menopause     Routine Papanicolaou smear 05/11/2016    Pap/HPV neg (media tab)     Past Surgical History:   Procedure Laterality Date    COLONOSCOPY N/A 10/30/2017    [facility]COPY    COLONOSCOPY,DIAGNOSTIC  10/30/2017         HX COLONOSCOPY      HX HEENT      wisdom teeth     Current Outpatient Medications   Medication Instructions    ascorbic acid (vitamin C) (VITAMIN C) 1,000 mg, Oral, DAILY    atorvastatin (LIPITOR) 10 mg, Oral, DAILY    azelastine (ASTELIN) 137 mcg (0.1 %) nasal spray USE 1 SPRAY IN EACH NOSTRIL TWO TIMES A DAY. USE IN EACH NOSTRIL AS DIRECTED.     cetirizine (ZYRTEC) 5 mg, Oral    cholecalciferol (VITAMIN D3) 5,000 Units, Oral, DAILY    estradiol-norethindrone (CombiPatch) 0.05-0.14 mg/24 hr 1 Patch, TransDERmal, 2 TIMES WEEKLY    fluticasone (FLONASE) 50 mcg/Actuation nasal spray 2 Sprays, EVERY BEDTIME    gabapentin (NEURONTIN) 100 mg, Oral, 3 TIMES DAILY AS NEEDED    losartan (COZAAR) 50 mg tablet TAKE 1 TABLET BY MOUTH ONE TIME A DAY    montelukast (SINGULAIR) 10 mg, Oral, DAILY    polyethylene glycol (MIRALAX) 17 g, Oral, DAILY    valACYclovir (VALTREX) 1,000 mg, Oral, 3 TIMES DAILY       Allergies   Allergen Reactions    Bactrim [Sulfamethoprim] Rash       Review of Systems   Skin: Positive for rash. All other systems reviewed and are negative. Physical Exam  Constitutional:       General: She is not in acute distress. Appearance: Normal appearance. She is not ill-appearing or toxic-appearing. Skin:            Comments: Few scabbed over lesions to right scalp. No open or weeping lesions. No rash elsewhere. Neurological:      Mental Status: She is alert. ASSESSMENT and PLAN    ICD-10-CM ICD-9-CM    1. Herpes zoster without complication  G01.2 819.5    2. Neuralgic pain  M79.2 729.2      Cleared to RTW on 6/29, note and RTW clearance form completed. Follow-up with PCP prn.     Nestor James NP

## 2022-06-28 NOTE — PROGRESS NOTES
Chief Complaint   Patient presents with    Letter for School/Work     Pt was on FMLA and is ready to return to work.       Visit Vitals  /83   Pulse 87   Temp 97.5 °F (36.4 °C)   Resp 18   Ht 5' 1\" (1.549 m)   Wt 167 lb (75.8 kg)   SpO2 99%   BMI 31.55 kg/m²

## 2022-06-28 NOTE — LETTER
NOTIFICATION RETURN TO WORK / SCHOOL    6/28/2022 11:33 AM    Ms. Zeinab Argueta [address]  [address] [phone]      To Whom It May Concern:    Cassei Sheldon is currently under the care of Dinah Anguiano. She will return to work/school on 06/29/2022. If there are questions or concerns please have the patient contact our office.         Sincerely,      Nishi Maurice NP

## 2022-08-01 ENCOUNTER — OFFICE VISIT (OUTPATIENT)
Dept: PRIMARY CARE CLINIC | Age: 47
End: 2022-08-01
Payer: COMMERCIAL

## 2022-08-01 VITALS
DIASTOLIC BLOOD PRESSURE: 85 MMHG | OXYGEN SATURATION: 98 % | RESPIRATION RATE: 18 BRPM | HEART RATE: 84 BPM | BODY MASS INDEX: 31.53 KG/M2 | TEMPERATURE: 98.2 F | WEIGHT: 167 LBS | SYSTOLIC BLOOD PRESSURE: 121 MMHG | HEIGHT: 61 IN

## 2022-08-01 DIAGNOSIS — L73.9 STAPHYLOCOCCUS AUREUS SUPERFICIAL FOLLICULITIS: Primary | ICD-10-CM

## 2022-08-01 DIAGNOSIS — R21 RASH IN ADULT: ICD-10-CM

## 2022-08-01 DIAGNOSIS — B95.61 STAPHYLOCOCCUS AUREUS SUPERFICIAL FOLLICULITIS: Primary | ICD-10-CM

## 2022-08-01 PROCEDURE — 99213 OFFICE O/P EST LOW 20 MIN: CPT | Performed by: NURSE PRACTITIONER

## 2022-08-01 RX ORDER — DOXYCYCLINE 100 MG/1
100 CAPSULE ORAL 2 TIMES DAILY
Qty: 14 CAPSULE | Refills: 0 | Status: SHIPPED | OUTPATIENT
Start: 2022-08-01 | End: 2022-08-08

## 2022-08-01 NOTE — PROGRESS NOTES
HISTORY OF PRESENT ILLNESS  Dev Guzman is a 56 y.o. female. Patient here with rash on bilateral arms for 5 days. Rash started  Described as itching  Left arn. Noticed spots and itching. Recent zoster infection with lesions on   Cool shower. Has not treated. Domboro solution but has not used. Has other skin issues. Was in a water park 1 week. Does have a history of MRSA infections 2010. Reports using dial soap for years. Past Medical History:   Diagnosis Date    Asthma     Dense breast tissue on mammogram 10/22/2020    BI-RADS 1: Negative    Environmental allergies     High cholesterol     Hx of mammogram 10/28/2021    BI-RADS 1: Negative. No mammographic evidence of malignancy    Hypertension     IUD (intrauterine device) in place 03/16/2017    [facility]. Placed for endometrial protection with HRT. MRSA (methicillin resistant staph aureus) culture positive 2010    vulvar    Premature menopause     Routine Papanicolaou smear 05/11/2016    Pap/HPV neg (media tab)     Past Surgical History:   Procedure Laterality Date    COLONOSCOPY N/A 10/30/2017    COLONOSCOPY performed by Vinh Javed MD at [facility] ENDOSCOPY    COLONOSCOPY,DIAGNOSTIC  10/30/2017         HX COLONOSCOPY      HX HEENT      wisdom teeth         Review of Systems   Constitutional:  Negative for fever and malaise/fatigue. Gastrointestinal:  Negative for nausea and vomiting. Musculoskeletal:  Negative for myalgias. Skin:  Positive for itching and rash. Neurological:  Negative for headaches. All other systems reviewed and are negative. Physical Exam  Vitals and nursing note reviewed. Constitutional:       Appearance: Normal appearance. HENT:      Head: Normocephalic. Nose: Nose normal.      Mouth/Throat:      Mouth: Mucous membranes are moist.   Eyes:      Pupils: Pupils are equal, round, and reactive to light. Cardiovascular:      Rate and Rhythm: Normal rate. Pulses: Normal pulses.    Pulmonary: New Patient Visit Note       Active Problems      1. Calculus of gallbladder without cholecystitis without obstruction    2.  Gallstone pancreatitis        Chief Complaint   Abdominal pain    History of Present Illness   Mayelin Noel is a 25-year-old w Effort: Pulmonary effort is normal.   Musculoskeletal:      Cervical back: Normal range of motion. Skin:     General: Skin is warm. Comments: Scattered pustules on bilateral arms. Few lesions on mid abdomen. Neurological:      General: No focal deficit present. Mental Status: She is alert. Psychiatric:         Mood and Affect: Mood normal.     ASSESSMENT and PLAN    ICD-10-CM ICD-9-CM    1. Staphylococcus aureus superficial folliculitis  O95.7 708.7     B95.61 041.11       2. Rash in adult  R21 782.1         Encounter Diagnoses   Name Primary? Staphylococcus aureus superficial folliculitis Yes    Rash in adult      Orders Placed This Encounter    doxycycline (VIBRAMYCIN) 100 mg capsule   Stop using dial soap. Medications as directed  Take with food. Complete entire course of prescription. Follow plan of care as discussed. Schedule with dermatology.   Signed By: JENNIFER Smith     August 1, 2022 medications    She is  and has 4 children. She currently does not work. Allergies  Corine is allergic to latex and pork derived products.     Past Medical / Surgical / Social / Family History    The past medical and past surgical history have pain and nausea. Negative for abdominal distention, anal bleeding, blood in stool, constipation, diarrhea and vomiting. Genitourinary: Negative for difficulty urinating, dysuria, frequency and urgency.    Musculoskeletal: Negative for arthralgias and myal Index Registry. PATIENT STATED HISTORY:(As transcribed by Technologist)  Patient has left upper abdomen pain in the area of left ribs. No injury. Pain is intermittent for one week. Pain is worse after eating.       CONTRAST USED:  80cc of Omnipaque 350 Calculus of gallbladder without cholecystitis without obstruction  (primary encounter diagnosis)  Gallstone pancreatitis     The patient does not have acute cholecystitis.   She likely had an episode of mild gallstone pancreatitis at the time of her prese

## 2022-08-01 NOTE — PROGRESS NOTES
Patient here for rashes on both arms. Some areas on right arm have a head to them. Patient had shingles twice recently but does not think this is that again. Denies using any OTC medication to prevent itching or spreading. Also Patient denies coming in contact with poison ivy. Would like to see  a dermatologist. Symptoms started a few days ago.

## 2022-09-01 LAB
CHOLEST SERPL-MCNC: 174 MG/DL
GLUCOSE SERPL-MCNC: 100 MG/DL (ref 65–100)
HDLC SERPL-MCNC: 44 MG/DL
LDLC SERPL CALC-MCNC: 76.8 MG/DL (ref 0–100)
TRIGL SERPL-MCNC: 266 MG/DL (ref ?–150)

## 2022-09-21 ENCOUNTER — OFFICE VISIT (OUTPATIENT)
Dept: PRIMARY CARE CLINIC | Age: 47
End: 2022-09-21
Payer: COMMERCIAL

## 2022-09-21 VITALS
TEMPERATURE: 97.7 F | RESPIRATION RATE: 16 BRPM | SYSTOLIC BLOOD PRESSURE: 125 MMHG | OXYGEN SATURATION: 98 % | WEIGHT: 163 LBS | HEIGHT: 61 IN | DIASTOLIC BLOOD PRESSURE: 87 MMHG | HEART RATE: 87 BPM | BODY MASS INDEX: 30.78 KG/M2

## 2022-09-21 DIAGNOSIS — L02.91 ABSCESS: Primary | ICD-10-CM

## 2022-09-21 PROCEDURE — 99213 OFFICE O/P EST LOW 20 MIN: CPT | Performed by: NURSE PRACTITIONER

## 2022-09-21 RX ORDER — MUPIROCIN 20 MG/G
OINTMENT TOPICAL DAILY
Qty: 22 G | Refills: 0 | Status: SHIPPED | OUTPATIENT
Start: 2022-09-21

## 2022-09-21 NOTE — PROGRESS NOTES
Chief Complaint   Patient presents with    Skin Problem     Has reddened raised areas on right arm, areas on right arm itches and is somewhat painful; top right side of scalp and the right breast area; Hx or MRSA; Diagnosed with folliculitis by our clinic in August 2022; allergic to Bactrim but nothing else that she is aware of; has not done any yard work recently.    Visit Vitals  /87   Pulse 87   Temp 97.7 °F (36.5 °C) (Temporal)   Resp 16   Ht 5' 1\" (1.549 m)   Wt 163 lb (73.9 kg)   LMP 03/01/2017   SpO2 98%   BMI 30.80 kg/m²

## 2022-09-21 NOTE — PROGRESS NOTES
Juanita Hung is a 56 y.o. female who presents today with the following:  Chief Complaint   Patient presents with    Skin Problem     Has reddened raised areas on right arm, areas on right arm itches and is somewhat painful; top right side of scalp and the right breast area; Hx or MRSA; Diagnosed with folliculitis by our clinic in August 2022; allergic to Bactrim but nothing else that she is aware of; has not done any yard work recently. HPI  On Friday noticed a red, purple area on right breast, felt like a lump. Then on Sunday and Monday, noted red raised lesion on right forearm and right side of scalp. Describes as itching on the arm in particular, scalp has intermittent itching. Denies any pain  Has a h/o of MRSA and this year treated for shingles and folliculits   Denies new soaps, lotions, detergents, perfumes, foods, drinks   Has placed peroxide and chlorhexidine wash on lesion which helped some with discomfort  Last night used Domboro solution for scalp  - but any symptoms of burning, numbness or tingling   Has appt for Dermatology on Oct 13  Had allergy skin testing in the past - currently on allergy injections - allergies mainly environmental    Past Medical History:   Diagnosis Date    Asthma     Dense breast tissue on mammogram 10/22/2020    BI-RADS 1: Negative    Environmental allergies     High cholesterol     Hx of mammogram 10/28/2021    BI-RADS 1: Negative. No mammographic evidence of malignancy    Hypertension     IUD (intrauterine device) in place 03/16/2017    [facility]. Placed for endometrial protection with HRT. MRSA (methicillin resistant staph aureus) culture positive 2010    vulvar    Premature menopause     Routine Papanicolaou smear 05/11/2016    Pap/HPV neg (media tab)       Allergies   Allergen Reactions    Bactrim [Sulfamethoprim] Rash     Current Outpatient Medications   Medication Sig    valACYclovir (VALTREX) 1 gram tablet Take 1 Tablet by mouth three (3) times daily. gabapentin (NEURONTIN) 100 mg capsule Take 1 Capsule by mouth three (3) times daily as needed for Pain. Max Daily Amount: 300 mg.    polyethylene glycol (MIRALAX) 17 gram/dose powder Take 17 g by mouth daily. azelastine (ASTELIN) 137 mcg (0.1 %) nasal spray USE 1 SPRAY IN EACH NOSTRIL TWO TIMES A DAY. USE IN EACH NOSTRIL AS DIRECTED. cetirizine (ZYRTEC) 10 mg tablet Take 5 mg by mouth.    losartan (COZAAR) 50 mg tablet TAKE 1 TABLET BY MOUTH ONE TIME A DAY    atorvastatin (LIPITOR) 10 mg tablet Take 1 Tablet by mouth daily. Indications: stroke prevention    montelukast (SINGULAIR) 10 mg tablet Take 1 Tablet by mouth daily. estradiol-norethindrone (CombiPatch) 0.05-0.14 mg/24 hr 1 Patch by TransDERmal route two (2) times a week. cholecalciferol, VITAMIN D3, (VITAMIN D3) 5,000 unit tab tablet Take 5,000 Units by mouth daily. ascorbic acid, vitamin C, (VITAMIN C) 1,000 mg tablet Take 1,000 mg by mouth daily. fluticasone propionate (FLONASE) 50 mcg/actuation nasal spray 2 Sprays by Both Nostrils route nightly. No current facility-administered medications for this visit. Review of Systems   Skin:  Positive for itching and rash (1 lesion on right forearm). /87   Pulse 87   Temp 97.7 °F (36.5 °C) (Temporal)   Resp 16   Ht 5' 1\" (1.549 m)   Wt 163 lb (73.9 kg)   LMP 03/01/2017   SpO2 98%   BMI 30.80 kg/m²   Physical Exam  Constitutional:       Appearance: Normal appearance. HENT:      Head: Normocephalic. Hair is normal.      Comments: No lesion noted in scalp. Areas of erythema noted but scalp non-tender  Skin:     General: Skin is warm. Comments: Right breast exam with 2mm area of erythema that blanches, skin warm, non tender to palpation. No lesion of right breast palpated. Right forearm with 2 mm pustule with erythemic base that is non-tender to palpation   Neurological:      Mental Status: She is alert.        1. Abscess   - Right forearm about 2 mm   - Apply warm compress 3-4x/day   - Given history of MRSA - will rx murpiricon and may use if worsen or lesion enlarges   - Follow up as needed

## 2022-11-02 DIAGNOSIS — Z91.09 ENVIRONMENTAL ALLERGIES: ICD-10-CM

## 2022-11-02 DIAGNOSIS — E78.2 MIXED HYPERLIPIDEMIA: ICD-10-CM

## 2022-11-02 RX ORDER — ATORVASTATIN CALCIUM 10 MG/1
10 TABLET, FILM COATED ORAL DAILY
Qty: 90 TABLET | Refills: 3 | Status: SHIPPED | OUTPATIENT
Start: 2022-11-02

## 2022-11-02 RX ORDER — ESTRADIOL/NORETHINDRONE ACETATE TRANSDERMAL SYSTEM .05; .14 MG/D; MG/D
1 PATCH, EXTENDED RELEASE TRANSDERMAL 2 TIMES WEEKLY
Qty: 24 PATCH | Refills: 0 | Status: SHIPPED | OUTPATIENT
Start: 2022-11-04 | End: 2022-12-01 | Stop reason: SDUPTHER

## 2022-11-02 RX ORDER — MONTELUKAST SODIUM 10 MG/1
10 TABLET ORAL DAILY
Qty: 90 TABLET | Refills: 3 | Status: SHIPPED | OUTPATIENT
Start: 2022-11-02

## 2022-11-02 NOTE — TELEPHONE ENCOUNTER
Future Appointments:  Future Appointments   Date Time Provider Dottie Contehi   12/1/2022 10:20 AM TIFFANIE SANCHEZ [facility] BS AMB   12/1/2022 10:40 AM Praveen Aguiar MD [facility] BS AMB   12/8/2022  9:15 AM Stephanie Willis MD [facility] BS AMB        Last Appointment With Me:  6/9/2022     Requested Prescriptions     Pending Prescriptions Disp Refills    atorvastatin (LIPITOR) 10 mg tablet 90 Tablet 3     Sig: Take 1 Tablet by mouth daily. Indications: stroke prevention    montelukast (SINGULAIR) 10 mg tablet 90 Tablet 3     Sig: Take 1 Tablet by mouth daily.

## 2022-11-02 NOTE — TELEPHONE ENCOUNTER
56year old patient last seen in the office on 10/28/2021 and has upcoming appointment on 12/1/2022 for ae and mammogram    Prescription sent as per MD order to get patient to her scheduled annual exam    Patient was sent a my chart  message

## 2022-11-22 ENCOUNTER — OFFICE VISIT (OUTPATIENT)
Dept: PRIMARY CARE CLINIC | Age: 47
End: 2022-11-22
Payer: COMMERCIAL

## 2022-11-22 VITALS
HEART RATE: 95 BPM | RESPIRATION RATE: 17 BRPM | BODY MASS INDEX: 31.34 KG/M2 | WEIGHT: 166 LBS | TEMPERATURE: 97.7 F | SYSTOLIC BLOOD PRESSURE: 134 MMHG | OXYGEN SATURATION: 98 % | HEIGHT: 61 IN | DIASTOLIC BLOOD PRESSURE: 92 MMHG

## 2022-11-22 DIAGNOSIS — J20.9 ACUTE BRONCHITIS, UNSPECIFIED ORGANISM: Primary | ICD-10-CM

## 2022-11-22 PROCEDURE — 99213 OFFICE O/P EST LOW 20 MIN: CPT | Performed by: NURSE PRACTITIONER

## 2022-11-22 RX ORDER — AMOXICILLIN AND CLAVULANATE POTASSIUM 875; 125 MG/1; MG/1
1 TABLET, FILM COATED ORAL EVERY 12 HOURS
Qty: 20 TABLET | Refills: 0 | Status: SHIPPED | OUTPATIENT
Start: 2022-11-22 | End: 2022-12-02

## 2022-11-22 RX ORDER — GUAIFENESIN 600 MG/1
600 TABLET, EXTENDED RELEASE ORAL 2 TIMES DAILY
COMMUNITY

## 2022-11-22 RX ORDER — SYRINGE WITH NEEDLE, 1 ML 28GX1/2"
SYRINGE, EMPTY DISPOSABLE MISCELLANEOUS
COMMUNITY
Start: 2022-09-11

## 2022-11-22 RX ORDER — PREDNISONE 20 MG/1
20 TABLET ORAL 2 TIMES DAILY
Qty: 10 TABLET | Refills: 0 | Status: SHIPPED | OUTPATIENT
Start: 2022-11-22 | End: 2022-11-27

## 2022-11-22 RX ORDER — BENZONATATE 200 MG/1
200 CAPSULE ORAL
Qty: 20 CAPSULE | Refills: 0 | Status: SHIPPED | OUTPATIENT
Start: 2022-11-22 | End: 2022-11-29

## 2022-11-22 NOTE — PROGRESS NOTES
Chief Complaint   Patient presents with    Cough     Patient reports productive cough and nasal congestion for one week     Visit Vitals  BP (!) 134/92   Pulse 95   Temp 97.7 °F (36.5 °C)   Resp 17   Ht 5' 1\" (1.549 m)   Wt 166 lb (75.3 kg)   LMP 03/01/2017   SpO2 98%   BMI 31.37 kg/m²

## 2022-11-22 NOTE — PROGRESS NOTES
Chief Complaint   Patient presents with    Cough     Patient reports productive cough and nasal congestion for one week   HISTORY OF PRESENT ILLNESS  Fred Jara is a 56 y.o. female. Patient presents with cold symptoms 8 days ago. Patient reports productive cough and nasal congestion. She was taking mucinex and treatment for allergies. She denies fever, body aches, or chills. Home covid test was negative. Hx of sinus infections. Review of Systems   Constitutional: Negative. HENT:  Positive for congestion and sinus pain. Respiratory:  Positive for cough. Negative for shortness of breath and wheezing. Cardiovascular: Negative. Gastrointestinal: Negative. Past Medical History:   Diagnosis Date    Asthma     Dense breast tissue on mammogram 10/22/2020    BI-RADS 1: Negative    Environmental allergies     High cholesterol     Hx of mammogram 10/28/2021    BI-RADS 1: Negative. No mammographic evidence of malignancy    Hypertension     IUD (intrauterine device) in place 03/16/2017    [facility]. Placed for endometrial protection with HRT. MRSA (methicillin resistant staph aureus) culture positive 2010    vulvar    Premature menopause     Routine Papanicolaou smear 05/11/2016    Pap/HPV neg (media tab)     Past Surgical History:   Procedure Laterality Date    COLONOSCOPY N/A 10/30/2017    COLONOSCOPY performed by Angie Vivar MD at [facility] ENDOSCOPY    COLONOSCOPY,DIAGNOSTIC  10/30/2017         HX COLONOSCOPY      HX HEENT      wisdom teeth   Physical Exam  Visit Vitals  BP (!) 134/92   Pulse 95   Temp 97.7 °F (36.5 °C)   Resp 17   Ht 5' 1\" (1.549 m)   Wt 166 lb (75.3 kg)   SpO2 98%   BMI 31.37 kg/m²   ASSESSMENT and PLAN    ICD-10-CM ICD-9-CM    1. Acute bronchitis, unspecified organism  J20.9 466.0         Encounter Diagnoses   Name Primary?     Acute bronchitis, unspecified organism Yes     Orders Placed This Encounter    guaiFENesin ER (Mucinex) 600 mg ER tablet    BD Allergy Syringe 1 mL 28 gauge x 1/2\" syrg    benzonatate (TESSALON) 200 mg capsule    predniSONE (DELTASONE) 20 mg tablet    amoxicillin-clavulanate (AUGMENTIN) 875-125 mg per tablet   - Take medication as prescribed  - Add OTC tylenol for fever/discomfort prn  - rest, push fluids  - Follow up if not better or worsens    Signed By: JENNIFER Uribe     November 22, 2022

## 2022-12-01 ENCOUNTER — OFFICE VISIT (OUTPATIENT)
Dept: OBGYN CLINIC | Age: 47
End: 2022-12-01

## 2022-12-01 VITALS
DIASTOLIC BLOOD PRESSURE: 76 MMHG | BODY MASS INDEX: 30.99 KG/M2 | SYSTOLIC BLOOD PRESSURE: 136 MMHG | HEART RATE: 103 BPM | WEIGHT: 164 LBS

## 2022-12-01 DIAGNOSIS — N95.1 MENOPAUSAL SYNDROME ON HORMONE REPLACEMENT THERAPY: ICD-10-CM

## 2022-12-01 DIAGNOSIS — Z01.419 ENCOUNTER FOR WELL WOMAN EXAM: Primary | ICD-10-CM

## 2022-12-01 DIAGNOSIS — Z79.890 MENOPAUSAL SYNDROME ON HORMONE REPLACEMENT THERAPY: ICD-10-CM

## 2022-12-01 DIAGNOSIS — N89.8 VAGINA ITCHING: ICD-10-CM

## 2022-12-01 PROCEDURE — 99396 PREV VISIT EST AGE 40-64: CPT | Performed by: OBSTETRICS & GYNECOLOGY

## 2022-12-01 RX ORDER — TERCONAZOLE 80 MG/1
80 SUPPOSITORY VAGINAL
Qty: 3 SUPPOSITORY | Refills: 0 | Status: SHIPPED | OUTPATIENT
Start: 2022-12-01 | End: 2022-12-04

## 2022-12-01 RX ORDER — ESTRADIOL/NORETHINDRONE ACETATE TRANSDERMAL SYSTEM .05; .14 MG/D; MG/D
1 PATCH, EXTENDED RELEASE TRANSDERMAL 2 TIMES WEEKLY
Qty: 24 PATCH | Refills: 4 | Status: SHIPPED | OUTPATIENT
Start: 2022-12-02

## 2022-12-01 NOTE — PROGRESS NOTES
Yulia Ghosh is a 56 y.o. female returns for an annual exam     Chief Complaint   Patient presents with    Well Woman       Patient's last menstrual period was 03/01/2017. Her periods are  nonexistent  in flow . Problems:  a little vaginal itching after having had augmentin  Birth Control: none. Last Pap: ASCUS obtained 10/2020  She does not have a history of JERRI 2, 3 or cervical cancer. Last Mammogram: had her mammogram today in our office. 1. Have you been to the ER, urgent care clinic, or hospitalized since your last visit? No    2. Have you seen or consulted any other health care providers outside of the [address] [place] [address] since your last visit?  No

## 2022-12-08 ENCOUNTER — OFFICE VISIT (OUTPATIENT)
Dept: INTERNAL MEDICINE CLINIC | Age: 47
End: 2022-12-08
Payer: COMMERCIAL

## 2022-12-08 VITALS
WEIGHT: 164 LBS | HEART RATE: 91 BPM | SYSTOLIC BLOOD PRESSURE: 112 MMHG | DIASTOLIC BLOOD PRESSURE: 77 MMHG | RESPIRATION RATE: 16 BRPM | TEMPERATURE: 97.2 F | OXYGEN SATURATION: 99 % | BODY MASS INDEX: 30.96 KG/M2 | HEIGHT: 61 IN

## 2022-12-08 DIAGNOSIS — Z91.09 ENVIRONMENTAL ALLERGIES: ICD-10-CM

## 2022-12-08 DIAGNOSIS — Z00.00 ROUTINE MEDICAL EXAM: Primary | ICD-10-CM

## 2022-12-08 DIAGNOSIS — E78.2 MIXED HYPERLIPIDEMIA: ICD-10-CM

## 2022-12-08 DIAGNOSIS — I10 ESSENTIAL HYPERTENSION: ICD-10-CM

## 2022-12-08 DIAGNOSIS — E55.9 VITAMIN D DEFICIENCY: ICD-10-CM

## 2022-12-08 DIAGNOSIS — J30.9 ALLERGIC RHINITIS, UNSPECIFIED SEASONALITY, UNSPECIFIED TRIGGER: ICD-10-CM

## 2022-12-08 PROCEDURE — 99396 PREV VISIT EST AGE 40-64: CPT | Performed by: FAMILY MEDICINE

## 2022-12-08 NOTE — PROGRESS NOTES
1. \"Have you been to the ER, urgent care clinic since your last visit? Hospitalized since your last visit? \" No    2. \"Have you seen or consulted any other health care providers outside of the [address] [place] [address] since your last visit? \" No     3. For patients aged 39-70: Has the patient had a colonoscopy / FIT/ Cologuard? Yes - Care Gap present. Most recent result on file      If the patient is female:    4. For patients aged 41-77: Has the patient had a mammogram within the past 2 years? Yes - Care Gap present. Most recent result on file      5. For patients aged 21-65: Has the patient had a pap smear? Yes - Care Gap present.  Most recent result on file

## 2022-12-08 NOTE — PROGRESS NOTES
Cadence Yin is a 56 y.o. female who presents for annual exam     Treated for HTN. Losartan 50mg once a day. BP controlled. No dizziness, headaches, or visual changes. Active at home, walking 2-3 days a week. no sx with exertion. On singulair, claritin, astelin, and flonase. On immunotherapy for many years,  Sees Dr. Ander Tadeo, allergy. History of asthma. No albuterol. On statin. No myalgias. LDL  At goal.       Sees gyn, Dr. Nella Landau. Normal pap. IUD removed. No menses,menopausal. On combipatch 2 times a week. normal urination. Annual 3D mammogram Dec 2022      Prior colonoscopy in 2017. Dr. Maisha Jones. torutous colon. Otherwise normal. Daily 2 times a day. On miralax, once a day. MGM with colon cancer. Up to date on eye and dental.      Working on weight loss, using street strider. Shingles. Left ophthalmic branch, no eye involvement. Past Medical History:   Diagnosis Date    Asthma     Dense breast tissue on mammogram 10/22/2020    BI-RADS 1: Negative    Environmental allergies     High cholesterol     Hx of mammogram 12/01/2022    BI-RADS 1: Negative. No mammographic evidence of malignancy    Hypertension     IUD contraception 03/16/2017    Chung Tapia. Placed for endometrial protection with HRT. Removed 2/10/22    MRSA (methicillin resistant staph aureus) culture positive 2010    vulvar    Premature menopause     Routine Papanicolaou smear 10/22/2020    (5/11/16) Pap/HPV neg (media tab). (10/22/20) ASCUS/HPV Neg    Shingles 2022    X2       Family History   Problem Relation Age of Onset    Hypertension Mother     Elevated Lipids Mother     Cancer Father         NHL.      Hypertension Father     Diabetes Father     Elevated Lipids Father     Asthma Sister     Stroke Maternal Grandfather     Colon Cancer Maternal Grandmother     Stroke Maternal Grandmother     Asthma Sister     Migraines Sister        Social History     Socioeconomic History    Marital status:  Spouse name: Not on file    Number of children: Not on file    Years of education: Not on file    Highest education level: Not on file   Occupational History    Not on file   Tobacco Use    Smoking status: Never    Smokeless tobacco: Never   Vaping Use    Vaping Use: Never used   Substance and Sexual Activity    Alcohol use: Yes     Alcohol/week: 4.0 standard drinks     Types: 4 Glasses of wine per week     Comment: occasional     Drug use: No    Sexual activity: Yes     Partners: Male     Birth control/protection: I.U.D., None   Other Topics Concern    Not on file   Social History Narrative    Not on file     Social Determinants of Health     Financial Resource Strain: Low Risk     Difficulty of Paying Living Expenses: Not hard at all   Food Insecurity: No Food Insecurity    Worried About Running Out of Food in the Last Year: Never true    Ran Out of Food in the Last Year: Never true   Transportation Needs: Not on file   Physical Activity: Not on file   Stress: Not on file   Social Connections: Not on file   Intimate Partner Violence: Not on file   Housing Stability: Not on file       Current Outpatient Medications on File Prior to Visit   Medication Sig Dispense Refill    estradiol-norethindrone (CombiPatch) 0.05-0.14 mg/24 hr 1 Patch by TransDERmal route two (2) times a week. 24 Patch 4    guaiFENesin ER (MUCINEX) 600 mg ER tablet Take 600 mg by mouth two (2) times a day. BD Allergy Syringe 1 mL 28 gauge x 1/2\" syrg USE EVERY 3 TO 4 DAYS OR AS DIRECTED      atorvastatin (LIPITOR) 10 mg tablet Take 1 Tablet by mouth daily. Indications: stroke prevention 90 Tablet 3    montelukast (SINGULAIR) 10 mg tablet Take 1 Tablet by mouth daily. 90 Tablet 3    mupirocin (BACTROBAN) 2 % ointment Apply  to affected area daily. 22 g 0    valACYclovir (VALTREX) 1 gram tablet Take 1 Tablet by mouth three (3) times daily.  30 Tablet 1    gabapentin (NEURONTIN) 100 mg capsule Take 1 Capsule by mouth three (3) times daily as needed for Pain. Max Daily Amount: 300 mg. 30 Capsule 0    polyethylene glycol (MIRALAX) 17 gram/dose powder Take 17 g by mouth daily. azelastine (ASTELIN) 137 mcg (0.1 %) nasal spray USE 1 SPRAY IN EACH NOSTRIL TWO TIMES A DAY. USE IN EACH NOSTRIL AS DIRECTED. 3 Each 3    cetirizine (ZYRTEC) 10 mg tablet Take 5 mg by mouth.      losartan (COZAAR) 50 mg tablet TAKE 1 TABLET BY MOUTH ONE TIME A DAY 90 Tablet 3    cholecalciferol, VITAMIN D3, (VITAMIN D3) 5,000 unit tab tablet Take 5,000 Units by mouth daily. ascorbic acid, vitamin C, (VITAMIN C) 1,000 mg tablet Take 1,000 mg by mouth daily. fluticasone propionate (FLONASE) 50 mcg/actuation nasal spray 2 Sprays by Both Nostrils route nightly. No current facility-administered medications on file prior to visit. Review of Systems  Pertinent items are noted in HPI. Objective:     Visit Vitals  /77   Pulse 91   Temp 97.2 °F (36.2 °C) (Temporal)   Resp 16   Ht 5' 1\" (1.549 m)   Wt 164 lb (74.4 kg)   LMP 03/01/2017   SpO2 99%   BMI 30.99 kg/m²     Gen: well appearing female  HEENT:   PERRL,normal conjunctiva. External ear and canals normal, TMs no opacification or erythema,  OP no erythema, no exudates, MMM  Neck:  Supple. Thyroid normal size, nontender, without nodules. No masses or LAD  Resp:  No wheezing, no rhonchi, no rales. CV:  RRR, normal S1S2, no murmur. GI: soft, nontender, without masses. No hepatosplenomegaly. Extrem:  +2 pulses, no edema, warm distally      Assessment/Plan:       ICD-10-CM ICD-9-CM    1. Routine medical exam  Y36.78 D90.2 METABOLIC PANEL, COMPREHENSIVE      CBC W/O DIFF      LIPID PANEL      HEMOGLOBIN A1C WITH EAG      VITAMIN D, 25 HYDROXY      URINALYSIS W/ RFLX MICROSCOPIC      TSH 3RD GENERATION      METABOLIC PANEL, COMPREHENSIVE      CBC W/O DIFF      LIPID PANEL      HEMOGLOBIN A1C WITH EAG      VITAMIN D, 25 HYDROXY      URINALYSIS W/ RFLX MICROSCOPIC      TSH 3RD GENERATION      2.  Essential hypertension  I10 401.9       3. Vitamin D deficiency  E55.9 268.9 VITAMIN D, 25 HYDROXY      VITAMIN D, 25 HYDROXY      4. Environmental allergies  Z91.09 V15.09       5. Mixed hyperlipidemia  E78.2 272.2       6. Allergic rhinitis, unspecified seasonality, unspecified trigger  J30.9 477.9         Recommend heart healthy diet and regular cardiovascular exercise.         Christina Bynum MD

## 2022-12-09 LAB
25(OH)D3+25(OH)D2 SERPL-MCNC: 75.4 NG/ML (ref 30–100)
ALBUMIN SERPL-MCNC: 4.8 G/DL (ref 3.8–4.8)
ALBUMIN/GLOB SERPL: 2.2 {RATIO} (ref 1.2–2.2)
ALP SERPL-CCNC: 78 IU/L (ref 44–121)
ALT SERPL-CCNC: 32 IU/L (ref 0–32)
APPEARANCE UR: CLEAR
AST SERPL-CCNC: 20 IU/L (ref 0–40)
BILIRUB SERPL-MCNC: 1 MG/DL (ref 0–1.2)
BILIRUB UR QL STRIP: NEGATIVE
BUN SERPL-MCNC: 13 MG/DL (ref 6–24)
BUN/CREAT SERPL: 18 (ref 9–23)
CALCIUM SERPL-MCNC: 9.5 MG/DL (ref 8.7–10.2)
CHLORIDE SERPL-SCNC: 103 MMOL/L (ref 96–106)
CHOLEST SERPL-MCNC: 205 MG/DL (ref 100–199)
CO2 SERPL-SCNC: 24 MMOL/L (ref 20–29)
COLOR UR: YELLOW
CREAT SERPL-MCNC: 0.73 MG/DL (ref 0.57–1)
EGFR: 103 ML/MIN/1.73
ERYTHROCYTE [DISTWIDTH] IN BLOOD BY AUTOMATED COUNT: 12.4 % (ref 11.7–15.4)
EST. AVERAGE GLUCOSE BLD GHB EST-MCNC: 103 MG/DL
GLOBULIN SER CALC-MCNC: 2.2 G/DL (ref 1.5–4.5)
GLUCOSE SERPL-MCNC: 100 MG/DL (ref 70–99)
GLUCOSE UR QL STRIP: NEGATIVE
HBA1C MFR BLD: 5.2 % (ref 4.8–5.6)
HCT VFR BLD AUTO: 43.5 % (ref 34–46.6)
HDLC SERPL-MCNC: 38 MG/DL
HGB BLD-MCNC: 14.6 G/DL (ref 11.1–15.9)
HGB UR QL STRIP: NEGATIVE
KETONES UR QL STRIP: NEGATIVE
LDLC SERPL CALC-MCNC: 126 MG/DL (ref 0–99)
LEUKOCYTE ESTERASE UR QL STRIP: NEGATIVE
MCH RBC QN AUTO: 31.4 PG (ref 26.6–33)
MCHC RBC AUTO-ENTMCNC: 33.6 G/DL (ref 31.5–35.7)
MCV RBC AUTO: 94 FL (ref 79–97)
MICRO URNS: NORMAL
NITRITE UR QL STRIP: NEGATIVE
PH UR STRIP: 5.5 [PH] (ref 5–7.5)
PLATELET # BLD AUTO: 213 X10E3/UL (ref 150–450)
POTASSIUM SERPL-SCNC: 4 MMOL/L (ref 3.5–5.2)
PROT SERPL-MCNC: 7 G/DL (ref 6–8.5)
PROT UR QL STRIP: NEGATIVE
RBC # BLD AUTO: 4.65 X10E6/UL (ref 3.77–5.28)
SODIUM SERPL-SCNC: 140 MMOL/L (ref 134–144)
SP GR UR STRIP: 1.02 (ref 1–1.03)
TRIGL SERPL-MCNC: 230 MG/DL (ref 0–149)
TSH SERPL DL<=0.005 MIU/L-ACNC: 1.54 UIU/ML (ref 0.45–4.5)
UROBILINOGEN UR STRIP-MCNC: 0.2 MG/DL (ref 0.2–1)
VLDLC SERPL CALC-MCNC: 41 MG/DL (ref 5–40)
WBC # BLD AUTO: 3.9 X10E3/UL (ref 3.4–10.8)

## 2022-12-21 ENCOUNTER — OFFICE VISIT (OUTPATIENT)
Dept: PRIMARY CARE CLINIC | Age: 47
End: 2022-12-21
Payer: COMMERCIAL

## 2022-12-21 VITALS
HEART RATE: 99 BPM | WEIGHT: 168.6 LBS | TEMPERATURE: 98 F | RESPIRATION RATE: 16 BRPM | OXYGEN SATURATION: 99 % | HEIGHT: 61 IN | BODY MASS INDEX: 31.83 KG/M2 | SYSTOLIC BLOOD PRESSURE: 124 MMHG | DIASTOLIC BLOOD PRESSURE: 85 MMHG

## 2022-12-21 DIAGNOSIS — J01.01 ACUTE RECURRENT MAXILLARY SINUSITIS: Primary | ICD-10-CM

## 2022-12-21 PROCEDURE — 99213 OFFICE O/P EST LOW 20 MIN: CPT | Performed by: NURSE PRACTITIONER

## 2022-12-21 RX ORDER — AMOXICILLIN 875 MG/1
875 TABLET, FILM COATED ORAL 2 TIMES DAILY
Qty: 14 TABLET | Refills: 0 | Status: SHIPPED | OUTPATIENT
Start: 2022-12-21 | End: 2022-12-28

## 2022-12-21 RX ORDER — PREDNISONE 20 MG/1
20 TABLET ORAL 2 TIMES DAILY
Qty: 10 TABLET | Refills: 0 | Status: SHIPPED | OUTPATIENT
Start: 2022-12-21 | End: 2022-12-26

## 2022-12-21 NOTE — PROGRESS NOTES
Identified pt with two pt identifiers(name and ). Reviewed record in preparation for visit and have obtained necessary documentation. Chief Complaint   Patient presents with    Cold Symptoms     Started last Thursday; sinus pressure/congestion/cough(yellow)/sore throat/ears feel clogged; home covid Saturday - neg; taking mucinex      Vitals:    22 1007   BP: 124/85   Pulse: 99   Resp: 16   Temp: 98 °F (36.7 °C)   TempSrc: Temporal   SpO2: 99%   Weight: 168 lb 9.6 oz (76.5 kg)   Height: 5' 1\" (1.549 m)   PainSc:   0 - No pain   LMP: 2017       Health Maintenance Review: Patient reminded of \"due or due soon\" health maintenance. I have asked the patient to contact his/her primary care provider (PCP) for follow-up on his/her health maintenance. Coordination of Care Questionnaire:  :   1) Have you been to an emergency room, urgent care, or hospitalized since your last visit? If yes, where when, and reason for visit? no       2. Have seen or consulted any other health care provider since your last visit? If yes, where when, and reason for visit? NO      Patient is accompanied by self I have received verbal consent from Daniela Sepulveda to discuss any/all medical information while they are present in the room.

## 2022-12-21 NOTE — PROGRESS NOTES
Chief Complaint   Patient presents with    Cold Symptoms     Started last Thursday; sinus pressure/congestion/cough(yellow)/sore throat/ears feel clogged; home covid Saturday - neg; taking mucinex   HISTORY OF PRESENT ILLNESS  Roxann Frost is a 56 y.o. female. She reports sinus pressure and congestion onset seven days ago. She reports feeling worse. Taking mucinex. She is concerned about yellow mucous. She denies fever, chills, or body aches. Home covid test sat was negative. Hx of Sinus infections, under the care of allergist. Hx of deviated septum. Review of Systems   Constitutional: Negative. HENT:  Positive for congestion and sinus pain. Respiratory: Negative. Gastrointestinal: Negative. Musculoskeletal: Negative. Physical Exam  Vitals and nursing note reviewed. HENT:      Right Ear: Tympanic membrane normal.      Left Ear: Tympanic membrane normal.      Nose:      Right Sinus: Maxillary sinus tenderness present. Left Sinus: Maxillary sinus tenderness present. Mouth/Throat:      Pharynx: Oropharynx is clear. No oropharyngeal exudate or posterior oropharyngeal erythema. Cardiovascular:      Rate and Rhythm: Normal rate and regular rhythm. Pulmonary:      Effort: Pulmonary effort is normal.      Breath sounds: Normal breath sounds. Musculoskeletal:      Cervical back: Neck supple. Neurological:      Mental Status: She is alert. Past Medical History:   Diagnosis Date    Asthma     Dense breast tissue on mammogram 10/22/2020    BI-RADS 1: Negative    Environmental allergies     High cholesterol     Hx of mammogram 12/01/2022    BI-RADS 1: Negative. No mammographic evidence of malignancy    Hypertension     IUD contraception 03/16/2017    Zoanne Cheadle. Placed for endometrial protection with HRT.  Removed 2/10/22    MRSA (methicillin resistant staph aureus) culture positive 2010    vulvar    Premature menopause     Routine Papanicolaou smear 10/22/2020    (5/11/16) Pap/HPV neg (media tab). (10/22/20) ASCUS/HPV Neg    Shingles 2022    X2     Past Surgical History:   Procedure Laterality Date    COLONOSCOPY N/A 10/30/2017    COLONOSCOPY performed by Lore Duran MD at [facility] ENDOSCOPY    COLONOSCOPY,DIAGNOSTIC  10/30/2017         HX COLONOSCOPY      HX HEENT      wisdom teeth     /85 (BP 1 Location: Left arm, BP Patient Position: Sitting)   Pulse 99   Temp 98 °F (36.7 °C) (Temporal)   Resp 16   Ht 5' 1\" (1.549 m)   Wt 168 lb 9.6 oz (76.5 kg)   LMP 03/01/2017   SpO2 99%   BMI 31.86 kg/m²    ASSESSMENT and PLAN  1. Acute recurrent maxillary sinusitis  -     predniSONE (DELTASONE) 20 mg tablet; Take 1 Tablet by mouth two (2) times a day for 5 days. , Normal, Disp-10 Tablet, R-0  -     amoxicillin (AMOXIL) 875 mg tablet; Take 1 Tablet by mouth two (2) times a day for 7 days. , Normal, Disp-14 Tablet, R-0  -     REFERRAL TO ENT-OTOLARYNGOLOGY  Instructed to hold on antibiotics for 24-28 hours, start with prednisone. Discussed her hx of sinus infection and recommended ENT consultation, recommended saline nasal rinses at the onset of symptoms.  Continue home meds, may take OTC tylenol for discomfort prn   JENNIFER Lopez

## 2023-01-26 DIAGNOSIS — I10 ESSENTIAL HYPERTENSION: ICD-10-CM

## 2023-01-26 RX ORDER — LOSARTAN POTASSIUM 50 MG/1
TABLET ORAL
Qty: 90 TABLET | Refills: 3 | Status: SHIPPED | OUTPATIENT
Start: 2023-01-26

## 2023-01-26 NOTE — TELEPHONE ENCOUNTER
PCP: Morelia Vuong MD    Last appt: 12/8/2022  Future Appointments   Date Time Provider Dottie Contehi   12/7/2023 10:20 AM TIFFANIE SANCHEZ [facility] BS AMB   12/7/2023 10:40 AM Augusta Ordaz MD [facility] BS AMB   12/14/2023  9:00 AM Morelia Vuong MD [facility] BS AMB       Requested Prescriptions     Pending Prescriptions Disp Refills    losartan (COZAAR) 50 mg tablet 90 Tablet 3     Sig: TAKE 1 TABLET BY MOUTH ONE TIME A DAY

## 2023-02-26 ENCOUNTER — PATIENT MESSAGE (OUTPATIENT)
Dept: INTERNAL MEDICINE CLINIC | Age: 48
End: 2023-02-26

## 2023-03-16 ENCOUNTER — TELEPHONE (OUTPATIENT)
Dept: INTERNAL MEDICINE CLINIC | Age: 48
End: 2023-03-16

## 2023-03-16 NOTE — TELEPHONE ENCOUNTER
----- Message from Pete Morton sent at 3/16/2023  1:30 PM EDT -----  Subject: Appointment Request    Reason for Call: Established Patient Appointment needed: Routine Physical   Exam    QUESTIONS    Reason for appointment request? Available appointments did not meet   patient need     Additional Information for Provider?  Pt needs a callback to reschedule CPE   for 12/21 if possible.   ---------------------------------------------------------------------------  --------------  [address] [facility]  [phone]; OK to leave message on voicemail  ---------------------------------------------------------------------------  --------------  SCRIPT ANSWERS  COVID Screen: Rajeev Saeed

## 2023-03-16 NOTE — TELEPHONE ENCOUNTER
Called patient and advised Virtual on Tuesday and Off on Thursday. She is going to keep appointment and try to get someone to switch with her.

## 2023-07-10 ENCOUNTER — OFFICE VISIT (OUTPATIENT)
Age: 48
End: 2023-07-10
Payer: COMMERCIAL

## 2023-07-10 VITALS
OXYGEN SATURATION: 98 % | BODY MASS INDEX: 32.59 KG/M2 | HEART RATE: 96 BPM | WEIGHT: 172.6 LBS | RESPIRATION RATE: 18 BRPM | DIASTOLIC BLOOD PRESSURE: 86 MMHG | TEMPERATURE: 98.1 F | HEIGHT: 61 IN | SYSTOLIC BLOOD PRESSURE: 125 MMHG

## 2023-07-10 DIAGNOSIS — L23.7 ALLERGIC DERMATITIS DUE TO POISON IVY: Primary | ICD-10-CM

## 2023-07-10 PROCEDURE — 99203 OFFICE O/P NEW LOW 30 MIN: CPT | Performed by: PHYSICIAN ASSISTANT

## 2023-07-10 RX ORDER — METHYLPREDNISOLONE 4 MG/1
TABLET ORAL
Qty: 1 KIT | Refills: 0 | Status: SHIPPED | OUTPATIENT
Start: 2023-07-10 | End: 2023-07-16

## 2023-07-10 RX ORDER — HYDROXYZINE HYDROCHLORIDE 25 MG/1
25 TABLET, FILM COATED ORAL NIGHTLY
Qty: 30 TABLET | Refills: 0 | Status: SHIPPED | OUTPATIENT
Start: 2023-07-10 | End: 2023-08-09

## 2023-07-10 RX ORDER — TRIAMCINOLONE ACETONIDE 5 MG/G
CREAM TOPICAL
Qty: 30 G | Refills: 0 | Status: SHIPPED | OUTPATIENT
Start: 2023-07-10 | End: 2023-07-17

## 2023-07-10 ASSESSMENT — ENCOUNTER SYMPTOMS
EYES NEGATIVE: 1
RESPIRATORY NEGATIVE: 1
GASTROINTESTINAL NEGATIVE: 1

## 2023-07-10 NOTE — PROGRESS NOTES
Walker Thayer ( 1975) is a 56 y.o. female, Established Patient patient, here for evaluation of the following chief complaint(s):  Rash (Started thrusday; bilateral knees and lower leg/left arm; itching/burning/weeping; using ivarest and benadryl)       Information provided by, or accompanied by:   Patient. ASSESSMENT/PLAN:  Leo Winkler was seen today for rash. Diagnoses and all orders for this visit:    Allergic dermatitis due to poison ivy  -     methylPREDNISolone (MEDROL, KAZ,) 4 MG tablet; Take by mouth. -     hydrOXYzine HCl (ATARAX) 25 MG tablet; Take 1 tablet by mouth nightly  -     triamcinolone (ARISTOCORT) 0.5 % cream; Apply topically 2 times daily, max of ten consecutive days. Return in about 2 weeks (around 7/24/2023), or if symptoms worsen or fail to improve, for Rash. .     Rash  This is a new problem. Episode onset: 4 days. The rash is characterized by blistering, redness, swelling and itchiness. Associated with: poision ivy. Review of Systems   Constitutional: Negative. HENT: Negative. Eyes: Negative. Respiratory: Negative. Cardiovascular: Negative. Gastrointestinal: Negative. Skin:  Positive for rash. Neurological: Negative. Subjective:   Pt is a 56 y.o. female     Past Medical History:   Diagnosis Date    Asthma     Dense breast tissue on mammogram 10/22/2020    BI-RADS 1: Negative    Environmental allergies     High cholesterol     Hx of mammogram 12/01/2022    BI-RADS 1: Negative. No mammographic evidence of malignancy    Hypertension     IUD contraception 03/16/2017    Bairon Marti. Placed for endometrial protection with HRT. Removed 2/10/22    MRSA (methicillin resistant staph aureus) culture positive 2010    vulvar    Premature menopause     Routine Papanicolaou smear 10/22/2020    (5/11/16) Pap/HPV neg (media tab).  (10/22/20) ASCUS/HPV Neg    Shingles 2022    X2       Past Surgical History:   Procedure Laterality Date    COLONOSCOPY

## 2023-08-08 LAB
CHOLEST SERPL-MCNC: 219 MG/DL
GLUCOSE SERPL-MCNC: 110 MG/DL (ref 65–100)
HDLC SERPL-MCNC: 43 MG/DL
LDLC SERPL CALC-MCNC: 100.4 MG/DL (ref 0–100)
TRIGL SERPL-MCNC: 378 MG/DL

## 2023-10-01 RX ORDER — MONTELUKAST SODIUM 10 MG/1
10 TABLET ORAL DAILY
Qty: 90 TABLET | Refills: 1 | Status: SHIPPED | OUTPATIENT
Start: 2023-10-01

## 2023-10-01 RX ORDER — ATORVASTATIN CALCIUM 10 MG/1
10 TABLET, FILM COATED ORAL DAILY
Qty: 90 TABLET | Refills: 1 | Status: SHIPPED | OUTPATIENT
Start: 2023-10-01 | End: 2023-10-01 | Stop reason: SDUPTHER

## 2023-10-01 RX ORDER — ATORVASTATIN CALCIUM 10 MG/1
10 TABLET, FILM COATED ORAL DAILY
Qty: 90 TABLET | Refills: 1 | Status: SHIPPED | OUTPATIENT
Start: 2023-10-01

## 2023-10-01 RX ORDER — MONTELUKAST SODIUM 10 MG/1
10 TABLET ORAL DAILY
Qty: 90 TABLET | Refills: 1 | Status: SHIPPED | OUTPATIENT
Start: 2023-10-01 | End: 2023-10-01 | Stop reason: SDUPTHER

## 2023-10-05 ENCOUNTER — OFFICE VISIT (OUTPATIENT)
Age: 48
End: 2023-10-05

## 2023-10-05 VITALS
RESPIRATION RATE: 16 BRPM | OXYGEN SATURATION: 98 % | HEART RATE: 98 BPM | TEMPERATURE: 96.8 F | WEIGHT: 170.8 LBS | SYSTOLIC BLOOD PRESSURE: 139 MMHG | HEIGHT: 61 IN | DIASTOLIC BLOOD PRESSURE: 85 MMHG | BODY MASS INDEX: 32.25 KG/M2

## 2023-10-05 DIAGNOSIS — L23.7 ALLERGIC CONTACT DERMATITIS DUE TO PLANTS, EXCEPT FOOD: Primary | ICD-10-CM

## 2023-10-05 RX ORDER — TRIAMCINOLONE ACETONIDE 5 MG/G
OINTMENT TOPICAL
Qty: 15 G | Refills: 0 | Status: SHIPPED | OUTPATIENT
Start: 2023-10-05 | End: 2023-10-12

## 2024-01-07 NOTE — PROGRESS NOTES
Tiana Huffman is a 48 y.o. female who presents for annual exam    Treated for HTN.  Losartan 50mg once a day.  BP controlled.  No dizziness, headaches, or visual changes.   Active at home, walking 2-3 days a week. no sx with exertion.      On singulair, claritin, astelin, and flonase. On immunotherapy for many years,  Sees Dr. Lees, allergy.  History of asthma. No albuterol.  Prior pneumovax.       Treated for HLP.  On statin.  No myalgias.  LDL  At goal.       Sees gyn, Dr. White.  Normal pap. Postmenopausal.  On combipatch 2 times a week.  normal urination.      Annual 3D mammogram Dec 2023     Vitamin D 5k daily.       Prior colonoscopy in 2017, recheck 10 years. Dr. Villatoro. torutous colon. Otherwise normal. BM daily.  On miralax, once a day.  MGM with colon cancer.      Up to date on eye and dental.       Shingles. Left ophthalmic branch, no eye involvement.  recommended age 50.       Past Medical History:   Diagnosis Date    Asthma     Dense breast tissue on mammogram 10/22/2020    BI-RADS 1: Negative    Environmental allergies     High cholesterol     Hx of mammogram 12/07/2023    BI-RADS 2: Benign. No mammographic evidence of malignancy.    Hypertension     IUD contraception 03/16/2017    Kyleena.  Placed for endometrial protection with HRT. Removed 2/10/22    MRSA (methicillin resistant staph aureus) culture positive 2010    vulvar    Premature menopause     Routine Papanicolaou smear 10/22/2020    (5/11/16) Pap/HPV neg (media tab). (10/22/20) ASCUS/HPV Neg    Shingles 2022    X2       Family History   Problem Relation Age of Onset    Colon Cancer Maternal Grandmother     Stroke Maternal Grandmother     Migraines Sister     Asthma Sister     Stroke Maternal Grandfather     Asthma Sister     Elevated Lipids Father     Diabetes Father     Hypertension Father     Cancer Father         NHL.     Elevated Lipids Mother     Hypertension Mother         Social History     Socioeconomic History    Marital

## 2024-01-08 ENCOUNTER — OFFICE VISIT (OUTPATIENT)
Age: 49
End: 2024-01-08
Payer: COMMERCIAL

## 2024-01-08 VITALS
SYSTOLIC BLOOD PRESSURE: 110 MMHG | HEART RATE: 82 BPM | TEMPERATURE: 97.8 F | DIASTOLIC BLOOD PRESSURE: 70 MMHG | BODY MASS INDEX: 31.87 KG/M2 | WEIGHT: 168.8 LBS | OXYGEN SATURATION: 97 % | RESPIRATION RATE: 20 BRPM | HEIGHT: 61 IN

## 2024-01-08 DIAGNOSIS — Z00.00 ROUTINE MEDICAL EXAM: Primary | ICD-10-CM

## 2024-01-08 DIAGNOSIS — I10 ESSENTIAL (PRIMARY) HYPERTENSION: ICD-10-CM

## 2024-01-08 DIAGNOSIS — E55.9 VITAMIN D DEFICIENCY, UNSPECIFIED: ICD-10-CM

## 2024-01-08 DIAGNOSIS — E78.2 MIXED HYPERLIPIDEMIA: ICD-10-CM

## 2024-01-08 PROCEDURE — 99396 PREV VISIT EST AGE 40-64: CPT | Performed by: FAMILY MEDICINE

## 2024-01-08 PROCEDURE — 3078F DIAST BP <80 MM HG: CPT | Performed by: FAMILY MEDICINE

## 2024-01-08 PROCEDURE — 3074F SYST BP LT 130 MM HG: CPT | Performed by: FAMILY MEDICINE

## 2024-01-08 RX ORDER — MONTELUKAST SODIUM 10 MG/1
10 TABLET ORAL DAILY
Qty: 90 TABLET | Refills: 3 | Status: SHIPPED | OUTPATIENT
Start: 2024-01-08

## 2024-01-08 RX ORDER — ATORVASTATIN CALCIUM 10 MG/1
10 TABLET, FILM COATED ORAL DAILY
Qty: 90 TABLET | Refills: 3 | Status: SHIPPED | OUTPATIENT
Start: 2024-01-08

## 2024-01-08 RX ORDER — LOSARTAN POTASSIUM 50 MG/1
50 TABLET ORAL DAILY
Qty: 90 TABLET | Refills: 3 | Status: SHIPPED | OUTPATIENT
Start: 2024-01-08

## 2024-01-08 SDOH — ECONOMIC STABILITY: HOUSING INSECURITY
IN THE LAST 12 MONTHS, WAS THERE A TIME WHEN YOU DID NOT HAVE A STEADY PLACE TO SLEEP OR SLEPT IN A SHELTER (INCLUDING NOW)?: NO

## 2024-01-08 SDOH — ECONOMIC STABILITY: INCOME INSECURITY: HOW HARD IS IT FOR YOU TO PAY FOR THE VERY BASICS LIKE FOOD, HOUSING, MEDICAL CARE, AND HEATING?: NOT HARD AT ALL

## 2024-01-08 SDOH — ECONOMIC STABILITY: FOOD INSECURITY: WITHIN THE PAST 12 MONTHS, YOU WORRIED THAT YOUR FOOD WOULD RUN OUT BEFORE YOU GOT MONEY TO BUY MORE.: NEVER TRUE

## 2024-01-08 SDOH — ECONOMIC STABILITY: FOOD INSECURITY: WITHIN THE PAST 12 MONTHS, THE FOOD YOU BOUGHT JUST DIDN'T LAST AND YOU DIDN'T HAVE MONEY TO GET MORE.: NEVER TRUE

## 2024-01-08 ASSESSMENT — PATIENT HEALTH QUESTIONNAIRE - PHQ9
1. LITTLE INTEREST OR PLEASURE IN DOING THINGS: 0
2. FEELING DOWN, DEPRESSED OR HOPELESS: 0
SUM OF ALL RESPONSES TO PHQ QUESTIONS 1-9: 0
SUM OF ALL RESPONSES TO PHQ9 QUESTIONS 1 & 2: 0
SUM OF ALL RESPONSES TO PHQ QUESTIONS 1-9: 0

## 2024-01-09 LAB
25(OH)D3 SERPL-MCNC: 60.1 NG/ML (ref 30–100)
ALBUMIN SERPL-MCNC: 4.3 G/DL (ref 3.5–5)
ALBUMIN/GLOB SERPL: 1.4 (ref 1.1–2.2)
ALP SERPL-CCNC: 77 U/L (ref 45–117)
ALT SERPL-CCNC: 47 U/L (ref 12–78)
ANION GAP SERPL CALC-SCNC: 5 MMOL/L (ref 5–15)
APPEARANCE UR: CLEAR
AST SERPL-CCNC: 16 U/L (ref 15–37)
BASOPHILS # BLD: 0 K/UL (ref 0–0.1)
BASOPHILS NFR BLD: 0 % (ref 0–1)
BILIRUB SERPL-MCNC: 0.9 MG/DL (ref 0.2–1)
BILIRUB UR QL: NEGATIVE
BUN SERPL-MCNC: 14 MG/DL (ref 6–20)
BUN/CREAT SERPL: 19 (ref 12–20)
CALCIUM SERPL-MCNC: 9.7 MG/DL (ref 8.5–10.1)
CHLORIDE SERPL-SCNC: 111 MMOL/L (ref 97–108)
CHOLEST SERPL-MCNC: 196 MG/DL
CO2 SERPL-SCNC: 26 MMOL/L (ref 21–32)
COLOR UR: NORMAL
CREAT SERPL-MCNC: 0.72 MG/DL (ref 0.55–1.02)
DIFFERENTIAL METHOD BLD: NORMAL
EOSINOPHIL # BLD: 0.1 K/UL (ref 0–0.4)
EOSINOPHIL NFR BLD: 2 % (ref 0–7)
ERYTHROCYTE [DISTWIDTH] IN BLOOD BY AUTOMATED COUNT: 12.6 % (ref 11.5–14.5)
EST. AVERAGE GLUCOSE BLD GHB EST-MCNC: 100 MG/DL
GLOBULIN SER CALC-MCNC: 3.1 G/DL (ref 2–4)
GLUCOSE SERPL-MCNC: 103 MG/DL (ref 65–100)
GLUCOSE UR STRIP.AUTO-MCNC: NEGATIVE MG/DL
HBA1C MFR BLD: 5.1 % (ref 4–5.6)
HCT VFR BLD AUTO: 41.7 % (ref 35–47)
HDLC SERPL-MCNC: 41 MG/DL
HDLC SERPL: 4.8 (ref 0–5)
HGB BLD-MCNC: 14.1 G/DL (ref 11.5–16)
HGB UR QL STRIP: NEGATIVE
IMM GRANULOCYTES # BLD AUTO: 0 K/UL (ref 0–0.04)
IMM GRANULOCYTES NFR BLD AUTO: 0 % (ref 0–0.5)
KETONES UR QL STRIP.AUTO: NEGATIVE MG/DL
LDLC SERPL CALC-MCNC: 109.6 MG/DL (ref 0–100)
LEUKOCYTE ESTERASE UR QL STRIP.AUTO: NEGATIVE
LYMPHOCYTES # BLD: 1.1 K/UL (ref 0.8–3.5)
LYMPHOCYTES NFR BLD: 25 % (ref 12–49)
MCH RBC QN AUTO: 30.5 PG (ref 26–34)
MCHC RBC AUTO-ENTMCNC: 33.8 G/DL (ref 30–36.5)
MCV RBC AUTO: 90.3 FL (ref 80–99)
MONOCYTES # BLD: 0.3 K/UL (ref 0–1)
MONOCYTES NFR BLD: 6 % (ref 5–13)
NEUTS SEG # BLD: 3.1 K/UL (ref 1.8–8)
NEUTS SEG NFR BLD: 67 % (ref 32–75)
NITRITE UR QL STRIP.AUTO: NEGATIVE
NRBC # BLD: 0 K/UL (ref 0–0.01)
NRBC BLD-RTO: 0 PER 100 WBC
PH UR STRIP: 6 (ref 5–8)
PLATELET # BLD AUTO: 227 K/UL (ref 150–400)
PMV BLD AUTO: 11.5 FL (ref 8.9–12.9)
POTASSIUM SERPL-SCNC: 4.2 MMOL/L (ref 3.5–5.1)
PROT SERPL-MCNC: 7.4 G/DL (ref 6.4–8.2)
PROT UR STRIP-MCNC: NEGATIVE MG/DL
RBC # BLD AUTO: 4.62 M/UL (ref 3.8–5.2)
SODIUM SERPL-SCNC: 142 MMOL/L (ref 136–145)
SP GR UR REFRACTOMETRY: <1.005 (ref 1–1.03)
TRIGL SERPL-MCNC: 227 MG/DL
TSH SERPL DL<=0.05 MIU/L-ACNC: 1.97 UIU/ML (ref 0.36–3.74)
UROBILINOGEN UR QL STRIP.AUTO: 0.2 EU/DL (ref 0.2–1)
VLDLC SERPL CALC-MCNC: 45.4 MG/DL
WBC # BLD AUTO: 4.6 K/UL (ref 3.6–11)

## 2024-07-23 ENCOUNTER — OFFICE VISIT (OUTPATIENT)
Age: 49
End: 2024-07-23

## 2024-07-23 VITALS
WEIGHT: 170 LBS | TEMPERATURE: 99.8 F | HEART RATE: 99 BPM | BODY MASS INDEX: 32.12 KG/M2 | SYSTOLIC BLOOD PRESSURE: 137 MMHG | OXYGEN SATURATION: 99 % | DIASTOLIC BLOOD PRESSURE: 86 MMHG

## 2024-07-23 DIAGNOSIS — B96.89 ACUTE BACTERIAL SINUSITIS: Primary | ICD-10-CM

## 2024-07-23 DIAGNOSIS — J01.90 ACUTE BACTERIAL SINUSITIS: Primary | ICD-10-CM

## 2024-07-23 RX ORDER — AMOXICILLIN AND CLAVULANATE POTASSIUM 875; 125 MG/1; MG/1
1 TABLET, FILM COATED ORAL 2 TIMES DAILY
Qty: 14 TABLET | Refills: 0 | Status: SHIPPED | OUTPATIENT
Start: 2024-07-23 | End: 2024-07-30

## 2024-07-23 ASSESSMENT — ENCOUNTER SYMPTOMS: SINUS COMPLAINT: 1

## 2024-07-23 NOTE — PATIENT INSTRUCTIONS
Patient was seen today for sinus pressure and congestion which is lasted for the past week without improvement  Symptoms consistent with acute bacterial sinusitis  Augmentin as ordered, 2x per day for 7 days  Mucinex-DM OTC to help thin secretions and to help with cough  Afrin nasal spray for up to 3 days for nasal congestion  Saline sinus rinses, hot tea with honey, throat lozenges  Lots of fluids, plenty of rest  Return if symptoms persist or worsen  ED with any severe shortness of breath, chest pain, or if unable to tolerate food or fluids

## 2024-07-23 NOTE — PROGRESS NOTES
Tiana Huffman (:  1975) is a 48 y.o. female,Established patient, here for evaluation of the following chief complaint(s):  Sinus Problem (C/o sinus pressure and congestion. Sx 5 days.)      Assessment & Plan :  Visit Diagnoses and Associated Orders       Acute bacterial sinusitis    -  Primary    amoxicillin-clavulanate (AUGMENTIN) 875-125 MG per tablet [[id number]]                 Patient was seen today for sinus pressure and congestion which is lasted for the past week without improvement  Symptoms consistent with acute bacterial sinusitis  Augmentin as ordered, 2x per day for 7 days  Mucinex-DM OTC to help thin secretions and to help with cough  Afrin nasal spray for up to 3 days for nasal congestion  Saline sinus rinses, hot tea with honey, throat lozenges  Lots of fluids, plenty of rest  Return if symptoms persist or worsen  ED with any severe shortness of breath, chest pain, or if unable to tolerate food or fluids       Subjective :    Sinus Problem       48 y.o. female presents with symptoms of over 1 week of sinus pressure and congestion.  She states that her symptoms initially began last Monday, about 8 days ago.  Her symptoms persisted through the week and then did seem to acutely worsen about 3 to 4 days ago.  She denies fevers.  Reports occasional chills with headache, fatigue and general malaise.  She reports some bilateral ear fullness, nasal congestion with sinus pressure and discomfort as well as postnasal drip.  Denies significant sore throat.  Reports some mild coughing, occasionally productive of yellow sputum.  Denies any shortness of breath or wheezing.  Denies significant chest congestion.  No chest or abdominal pain.  No nausea, vomiting or diarrhea.  She states that she does periodically get acute bacterial sinus infections and this feels very similar to that.  Does not wish to be tested for COVID today as her symptoms have been going on for so long.  She is taking over-the-counter

## 2024-08-09 ENCOUNTER — OFFICE VISIT (OUTPATIENT)
Age: 49
End: 2024-08-09

## 2024-08-09 VITALS
OXYGEN SATURATION: 100 % | BODY MASS INDEX: 31.55 KG/M2 | DIASTOLIC BLOOD PRESSURE: 86 MMHG | TEMPERATURE: 99.1 F | WEIGHT: 167 LBS | HEART RATE: 92 BPM | SYSTOLIC BLOOD PRESSURE: 131 MMHG

## 2024-08-09 DIAGNOSIS — B96.89 ACUTE BACTERIAL SINUSITIS: Primary | ICD-10-CM

## 2024-08-09 DIAGNOSIS — J01.90 ACUTE BACTERIAL SINUSITIS: Primary | ICD-10-CM

## 2024-08-09 RX ORDER — DOXYCYCLINE HYCLATE 100 MG
100 TABLET ORAL 2 TIMES DAILY
Qty: 14 TABLET | Refills: 0 | Status: SHIPPED | OUTPATIENT
Start: 2024-08-09 | End: 2024-08-16

## 2024-08-09 ASSESSMENT — ENCOUNTER SYMPTOMS: SINUS COMPLAINT: 1

## 2024-08-09 NOTE — PROGRESS NOTES
results found for this visit on 08/09/24.      Objective   Physical Exam  Vitals and nursing note reviewed.   Constitutional:       General: She is not in acute distress.     Appearance: Normal appearance. She is ill-appearing.   HENT:      Head: Normocephalic and atraumatic.      Right Ear: Tympanic membrane, ear canal and external ear normal. There is no impacted cerumen.      Left Ear: Tympanic membrane, ear canal and external ear normal. There is no impacted cerumen.      Nose: Congestion and rhinorrhea present.      Right Turbinates: Enlarged and swollen.      Left Turbinates: Enlarged and swollen.      Right Sinus: Frontal sinus tenderness present. No maxillary sinus tenderness.      Left Sinus: Frontal sinus tenderness present. No maxillary sinus tenderness.      Mouth/Throat:      Mouth: Mucous membranes are moist.      Pharynx: Oropharynx is clear. No oropharyngeal exudate or posterior oropharyngeal erythema.   Cardiovascular:      Rate and Rhythm: Normal rate and regular rhythm.      Pulses: Normal pulses.      Heart sounds: Normal heart sounds. No murmur heard.     No friction rub. No gallop.   Pulmonary:      Effort: Pulmonary effort is normal. No respiratory distress.      Breath sounds: Normal breath sounds. No wheezing, rhonchi or rales.   Musculoskeletal:      Cervical back: Normal range of motion and neck supple. No tenderness.   Lymphadenopathy:      Cervical: No cervical adenopathy.   Skin:     General: Skin is warm and dry.   Neurological:      General: No focal deficit present.      Mental Status: She is alert and oriented to person, place, and time.               An electronic signature was used to authenticate this note.    WIN Muro NP

## 2024-08-09 NOTE — PATIENT INSTRUCTIONS
Symptoms consistent with acute bacterial sinusitis  Doxycycline as ordered, 2x per day for 7 days  Plain Mucinex OTC to help thin secretions  Saline sinus rinses, hot tea with honey, throat lozenges  Lots of fluids, plenty of rest  Return if symptoms persist or worsen  ED with any severe shortness of breath, chest pain, or if unable to tolerate food or fluids

## 2024-09-13 LAB
CHOLEST SERPL-MCNC: 182 MG/DL
GLUCOSE SERPL-MCNC: 104 MG/DL (ref 65–100)
HDLC SERPL-MCNC: 39 MG/DL
LDLC SERPL CALC-MCNC: 102 MG/DL (ref 0–100)
TRIGL SERPL-MCNC: 205 MG/DL

## 2024-12-23 ENCOUNTER — OFFICE VISIT (OUTPATIENT)
Age: 49
End: 2024-12-23
Payer: COMMERCIAL

## 2024-12-23 VITALS
WEIGHT: 173 LBS | SYSTOLIC BLOOD PRESSURE: 126 MMHG | HEART RATE: 95 BPM | DIASTOLIC BLOOD PRESSURE: 73 MMHG | BODY MASS INDEX: 32.69 KG/M2

## 2024-12-23 DIAGNOSIS — Z01.419 ENCOUNTER FOR WELL WOMAN EXAM WITH ROUTINE GYNECOLOGICAL EXAM: Primary | ICD-10-CM

## 2024-12-23 PROCEDURE — 99396 PREV VISIT EST AGE 40-64: CPT | Performed by: OBSTETRICS & GYNECOLOGY

## 2024-12-23 NOTE — PROGRESS NOTES
Per Nursing Note:   Tiana Huffman is a 49 y.o. female returns for an annual exam          Chief Complaint   Patient presents with    Annual Exam         No LMP recorded. Patient is postmenopausal.  Her periods are nonexistent in flow.  Problems: no problems  Birth Control: post menopausal status.  Last Pap: normal obtained 23  She does have a history of abn pap smear.     10/22/20 ASCUS but HPV-  Pap 23 N/N     Last Mammogram: had her mammogram today in our office.        Annual exam      Chief Complaint   Patient presents with    Annual Exam       Tiana Huffman is a ,  49 y.o. female   No LMP recorded. Patient is postmenopausal.  She presents for her annual checkup.   Traveled to [place] in September.    Kyleena removed 2/10/22    Had COVID over the summer.    She is not having gyn problems.      Colonoscopy: UTD  Mammo: Done today in our office.        Pap Smear:  Her most recent Pap smear was normal, HPV was negative, obtained 2023.    She does not have a history of abnormal paps.  Pap () ASCUS/HPV neg    Hormonal status:    has not had PMB  Was on CombiPatch, but then unable to get RX since Sept,  backordered.  Doing OK of HRT    Sexual history:    She  reports being sexually active and has had partner(s) who are male. She reports using the following method of birth control/protection: Post-menopausal.    Past Medical History:   Diagnosis Date    Abnormal Pap smear of cervix     (16) Pap/HPV neg (media tab). (10/22/20) ASCUS/HPV Neg    Asthma     Dense breast tissue on mammogram 10/22/2020    BI-RADS 1: Negative    Environmental allergies     High cholesterol     Hx of mammogram 2023    BI-RADS 2: Benign. No mammographic evidence of malignancy.    Hypertension     IUD contraception 2017    Kyleena.  Placed for endometrial protection with HRT. Removed 2/10/22    MRSA (methicillin resistant staph aureus) culture positive     vulvar    Premature menopause

## 2024-12-23 NOTE — PROGRESS NOTES
Tiana Huffman is a 49 y.o. female returns for an annual exam     Chief Complaint   Patient presents with    Annual Exam       No LMP recorded. Patient is postmenopausal.  Her periods are nonexistent in flow.  Problems: no problems  Birth Control: post menopausal status.  Last Pap: normal obtained 12/21/23  She does have a history of abn pap smear.    10/22/20 ASCUS but HPV-  Pap 12/21/23 N/N    Last Mammogram: had her mammogram today in our office.

## 2024-12-27 DIAGNOSIS — I10 ESSENTIAL (PRIMARY) HYPERTENSION: ICD-10-CM

## 2024-12-27 RX ORDER — LOSARTAN POTASSIUM 50 MG/1
50 TABLET ORAL DAILY
Qty: 90 TABLET | Refills: 3 | Status: SHIPPED | OUTPATIENT
Start: 2024-12-27

## 2024-12-28 ENCOUNTER — OFFICE VISIT (OUTPATIENT)
Age: 49
End: 2024-12-28

## 2024-12-28 VITALS
BODY MASS INDEX: 33.44 KG/M2 | TEMPERATURE: 98.7 F | DIASTOLIC BLOOD PRESSURE: 86 MMHG | OXYGEN SATURATION: 99 % | SYSTOLIC BLOOD PRESSURE: 147 MMHG | WEIGHT: 177 LBS | HEART RATE: 98 BPM

## 2024-12-28 DIAGNOSIS — J01.00 ACUTE NON-RECURRENT MAXILLARY SINUSITIS: ICD-10-CM

## 2024-12-28 DIAGNOSIS — R09.81 CONGESTION OF NASAL SINUS: Primary | ICD-10-CM

## 2024-12-28 LAB
INFLUENZA A ANTIGEN, POC: NEGATIVE
INFLUENZA B ANTIGEN, POC: NEGATIVE
Lab: 899
PERFORMING INSTRUMENT: NORMAL
QC PASS/FAIL: NORMAL
SARS-COV-2, POC: NORMAL

## 2024-12-28 NOTE — PROGRESS NOTES
Tiana Huffman (:  1975) is a 49 y.o. female,Established patient, here for evaluation of the following chief complaint(s):  Sinusitis (Pt present for sinus problems with yellow mucus 4 days )          ASSESSMENT/PLAN:    Assessment & Plan  Congestion of nasal sinus       Orders:    POCT COVID-19, Antigen    POCT Influenza A/B Antigen    Acute non-recurrent maxillary sinusitis          Discussed with patient COVID and flu test are negative however send patient is on day 5 of symptoms and seem to be worsening with sinus congestion and pressure concern for bacterial sinusitis we will go ahead and start patient on Augmentin, take medication twice a day with food to minimize GI upset also make sure getting plenty of fluids rest and sleep if symptoms do not improve in the next week or any worsening symptoms in the next few days please return to PCP urgent care or emergency department depending on the severity of symptoms       I have discussed the results, diagnosis and treatment plan with the patient. The patient also understands that early in the process of an illness, an urgent care workup can be falsely reassuring. Routine discharge counseling and specific return precautions discussed with patient and the patient understands that worsening, changing or persistent symptoms should prompt an immediate return to the urgent care or emergency department. Patient/Guardian expressed understanding and agrees with the discharge plan. No further questions at time of discharge.     SUBJECTIVE/OBJECTIVE:  49 y.o. female presents with symptoms of Sinusitis (Pt present for sinus problems with yellow mucus 4 days )      49-year-old female presents to urgent care stating she has had congestion headache and coughing patient states has a history of allergies so has been taking Mucinex with mild relief also took some acetaminophen cold and flu relief yesterday which helped a little bit patient is on day 5 of symptoms the

## 2024-12-28 NOTE — PATIENT INSTRUCTIONS
Discussed with patient COVID and flu test are negative however send patient is on day 5 of symptoms and seem to be worsening with sinus congestion and pressure concern for bacterial sinusitis we will go ahead and start patient on Augmentin, take medication twice a day with food to minimize GI upset also make sure getting plenty of fluids rest and sleep if symptoms do not improve in the next week or any worsening symptoms in the next few days please return to PCP urgent care or emergency department depending on the severity of symptoms

## 2024-12-31 ENCOUNTER — TELEPHONE (OUTPATIENT)
Age: 49
End: 2024-12-31

## 2024-12-31 RX ORDER — BENZONATATE 200 MG/1
200 CAPSULE ORAL 3 TIMES DAILY PRN
Qty: 30 CAPSULE | Refills: 0 | Status: SHIPPED | OUTPATIENT
Start: 2024-12-31 | End: 2025-01-07

## 2025-01-06 ENCOUNTER — OFFICE VISIT (OUTPATIENT)
Age: 50
End: 2025-01-06

## 2025-01-06 VITALS
WEIGHT: 177 LBS | TEMPERATURE: 99.4 F | SYSTOLIC BLOOD PRESSURE: 133 MMHG | OXYGEN SATURATION: 98 % | BODY MASS INDEX: 33.44 KG/M2 | DIASTOLIC BLOOD PRESSURE: 87 MMHG | RESPIRATION RATE: 16 BRPM | HEART RATE: 95 BPM

## 2025-01-06 DIAGNOSIS — J01.00 ACUTE MAXILLARY SINUSITIS, RECURRENCE NOT SPECIFIED: Primary | ICD-10-CM

## 2025-01-06 RX ORDER — PREDNISONE 20 MG/1
40 TABLET ORAL DAILY
Qty: 10 TABLET | Refills: 0 | Status: SHIPPED | OUTPATIENT
Start: 2025-01-06 | End: 2025-01-11

## 2025-01-06 NOTE — PATIENT INSTRUCTIONS
Sinus infections are usually self-limiting and do not require antibiotic therapy.   Use saline (saltwater) nasal washes. This can help keep your nasal passages open and wash out mucus and allergens.  You can buy saline nose washes at a grocery store or drugstore. Follow the instructions on the package.  Try a decongestant nasal spray like oxymetazoline (Afrin) Do not use it for more than 3 days in a row. Using it for more than 3 days can make your congestion worse.  If needed, take an over-the-counter pain medicine, such as acetaminophen (Tylenol), ibuprofen (Advil, Motrin), or naproxen (Aleve). Read and follow all instructions on the label.  Be careful when taking over-the-counter cold or influenza (flu) medicines and Tylenol at the same time. Many of these medicines have acetaminophen, which is Tylenol. Read the labels to make sure that you are not taking more than the recommended dose. Too much acetaminophen (Tylenol) can be harmful.  Try a steroid nasal spray daily such as Flonase as well as antihistamine such as Claritin or Zyrtec.   Breathe warm, moist air. You can use a steamy shower, a hot bath, or a sink filled with hot water. Avoid cold, dry air. Using a humidifier in your home may help. Follow the directions for cleaning the machine.  Do not smoke or allow others to smoke around you. If you need help quitting, talk to your doctor about stop-smoking programs and medicines. These can increase your chances of quitting for good.  Return to Clinic if mild worsening or changes in symptoms.  Report to ER if high or persistent fever, dehydration, new or severe worsening of symptoms.  Please follow up with PCP for persistent or recurrent symptoms.

## 2025-01-06 NOTE — PROGRESS NOTES
visit on 01/06/25.       ASSESSMENT/PLAN:    Clinical Impression:   Assessment & Plan  Acute maxillary sinusitis, recurrence not specified       Orders:    predniSONE (DELTASONE) 20 MG tablet; Take 2 tablets by mouth daily for 5 days         MDM: The patient presents with symptoms and clinic findings consistent with Sinusitis likely allergic with no improvement on 10 days of Augmentin   Will treat with sinus rinses daily, oral steroid and decongestant. At the time of discharge the patient clinically looked well. The patient had normal work of breathing. Normal level of alertness.  Well hydrated. No meningeal signs. Lungs CTA. Normal room air O2sat. No signs of sepsis.  ER precautions given to include persistent high fever, shortness of breath or labored breathing, chest pain or new / worsening symptoms. Symptoms care instructions given and discussed.  All questions answered.  Encouraged follow up with PCP for persistent or recurrent symptoms. Patient verbalized understanding and agreement with precautions and care plan.        An electronic signature was used to authenticate this note.    Rut Gtz PA-C

## 2025-03-25 LAB — HBA1C MFR BLD HPLC: 5.1 %

## 2025-04-13 NOTE — PROGRESS NOTES
Tiana Huffman is a 49 y.o. female who presents for annual exam    Reports hip pain, knee pain, lower back pain.  Taking glucosamine for 2 months, helping.  Taking ibuprofen and tylenol infrequently.  Walking some. Making diet changes. Weight unchanged.  Goal 145-150. In [facility] program for weight loss.  Recent labs.      Treated for HTN.  Losartan 50mg once a day.  BP controlled.      Sees Dr. Lees, allergy.  On singulair, claritin, astelin, and flonase. On immunotherapy for many years.     History of asthma. No albuterol.  Prior pneumovax.       Treated for HLP.  On lipitor 10mg daily.  .  No myalgias.  LDL  At goal.       Sees gyn, Dr. White.  Normal pap. Postmenopausal, off HRT. No vaginal symptoms. normal urination.      Annual 3D mammogram Dec 2024     Off vitamin D, in multivitamin.      Prior colonoscopy in 2017, recheck 10 years. Dr. Villatoro. torutous colon. Otherwise normal. BM daily.  On miralax, once a day.  MGM with colon cancer.      Up to date on eye and dental.       Shingles. Left ophthalmic branch, no eye involvement.  recommended age 50.           Past Medical History:   Diagnosis Date    Abnormal Pap smear of cervix     (5/11/16) Pap/HPV neg (media tab). (10/22/20) ASCUS/HPV Neg    Asthma     Dense breast tissue on mammogram 10/22/2020    BI-RADS 1: Negative    Environmental allergies     High cholesterol     Hx of mammogram 12/23/2024    BI-RADS 1: Negative. No mammographic evidence of malignancy.    Hypertension     IUD contraception 03/16/2017    Kyleena.  Placed for endometrial protection with HRT. Removed 2/10/22    MRSA (methicillin resistant staph aureus) culture positive 2010    vulvar    Premature menopause     Routine Papanicolaou smear 12/21/2023    nromal HPV neg    Shingles 2022    X2       Family History   Problem Relation Age of Onset    Colon Cancer Maternal Grandmother     Stroke Maternal Grandmother     Migraines Sister     Asthma Sister     Stroke Maternal

## 2025-04-14 ENCOUNTER — OFFICE VISIT (OUTPATIENT)
Age: 50
End: 2025-04-14
Payer: COMMERCIAL

## 2025-04-14 VITALS
SYSTOLIC BLOOD PRESSURE: 119 MMHG | BODY MASS INDEX: 31.68 KG/M2 | OXYGEN SATURATION: 100 % | HEIGHT: 61 IN | TEMPERATURE: 98.1 F | WEIGHT: 167.8 LBS | RESPIRATION RATE: 14 BRPM | HEART RATE: 83 BPM | DIASTOLIC BLOOD PRESSURE: 83 MMHG

## 2025-04-14 DIAGNOSIS — Z00.00 ROUTINE MEDICAL EXAM: Primary | ICD-10-CM

## 2025-04-14 DIAGNOSIS — I10 ESSENTIAL (PRIMARY) HYPERTENSION: ICD-10-CM

## 2025-04-14 DIAGNOSIS — Z91.09 ENVIRONMENTAL ALLERGIES: ICD-10-CM

## 2025-04-14 DIAGNOSIS — E55.9 VITAMIN D DEFICIENCY, UNSPECIFIED: ICD-10-CM

## 2025-04-14 DIAGNOSIS — E66.811 CLASS 1 OBESITY WITH SERIOUS COMORBIDITY AND BODY MASS INDEX (BMI) OF 31.0 TO 31.9 IN ADULT, UNSPECIFIED OBESITY TYPE: ICD-10-CM

## 2025-04-14 DIAGNOSIS — E78.2 MIXED HYPERLIPIDEMIA: ICD-10-CM

## 2025-04-14 DIAGNOSIS — J45.20 MILD INTERMITTENT ASTHMA WITHOUT COMPLICATION: ICD-10-CM

## 2025-04-14 DIAGNOSIS — Z00.00 ROUTINE MEDICAL EXAM: ICD-10-CM

## 2025-04-14 PROCEDURE — 99396 PREV VISIT EST AGE 40-64: CPT | Performed by: FAMILY MEDICINE

## 2025-04-14 PROCEDURE — 3074F SYST BP LT 130 MM HG: CPT | Performed by: FAMILY MEDICINE

## 2025-04-14 PROCEDURE — 3079F DIAST BP 80-89 MM HG: CPT | Performed by: FAMILY MEDICINE

## 2025-04-14 RX ORDER — MONTELUKAST SODIUM 10 MG/1
10 TABLET ORAL DAILY
Qty: 90 TABLET | Refills: 3 | Status: SHIPPED | OUTPATIENT
Start: 2025-04-14

## 2025-04-14 RX ORDER — ATORVASTATIN CALCIUM 20 MG/1
20 TABLET, FILM COATED ORAL DAILY
Qty: 90 TABLET | Refills: 3 | Status: SHIPPED | OUTPATIENT
Start: 2025-04-14

## 2025-04-14 RX ORDER — AZELASTINE 1 MG/ML
2 SPRAY, METERED NASAL 2 TIMES DAILY
Qty: 3 EACH | Refills: 3 | Status: SHIPPED | OUTPATIENT
Start: 2025-04-14

## 2025-04-14 RX ORDER — LOSARTAN POTASSIUM 50 MG/1
50 TABLET ORAL DAILY
Qty: 90 TABLET | Refills: 3 | Status: SHIPPED | OUTPATIENT
Start: 2025-04-14

## 2025-04-14 SDOH — ECONOMIC STABILITY: FOOD INSECURITY: WITHIN THE PAST 12 MONTHS, THE FOOD YOU BOUGHT JUST DIDN'T LAST AND YOU DIDN'T HAVE MONEY TO GET MORE.: NEVER TRUE

## 2025-04-14 SDOH — ECONOMIC STABILITY: FOOD INSECURITY: WITHIN THE PAST 12 MONTHS, YOU WORRIED THAT YOUR FOOD WOULD RUN OUT BEFORE YOU GOT MONEY TO BUY MORE.: NEVER TRUE

## 2025-04-14 ASSESSMENT — PATIENT HEALTH QUESTIONNAIRE - PHQ9
SUM OF ALL RESPONSES TO PHQ QUESTIONS 1-9: 0
SUM OF ALL RESPONSES TO PHQ QUESTIONS 1-9: 0
1. LITTLE INTEREST OR PLEASURE IN DOING THINGS: NOT AT ALL
SUM OF ALL RESPONSES TO PHQ QUESTIONS 1-9: 0
SUM OF ALL RESPONSES TO PHQ QUESTIONS 1-9: 0
2. FEELING DOWN, DEPRESSED OR HOPELESS: NOT AT ALL

## 2025-04-15 LAB
25(OH)D3 SERPL-MCNC: 69.1 NG/ML (ref 30–100)
BASOPHILS # BLD: 0.02 K/UL (ref 0–0.1)
BASOPHILS NFR BLD: 0.4 % (ref 0–1)
DIFFERENTIAL METHOD BLD: NORMAL
EOSINOPHIL # BLD: 0.1 K/UL (ref 0–0.4)
EOSINOPHIL NFR BLD: 1.8 % (ref 0–7)
ERYTHROCYTE [DISTWIDTH] IN BLOOD BY AUTOMATED COUNT: 12.3 % (ref 11.5–14.5)
HCT VFR BLD AUTO: 43 % (ref 35–47)
HGB BLD-MCNC: 14.5 G/DL (ref 11.5–16)
IMM GRANULOCYTES # BLD AUTO: 0.02 K/UL (ref 0–0.04)
IMM GRANULOCYTES NFR BLD AUTO: 0.4 % (ref 0–0.5)
LYMPHOCYTES # BLD: 1.31 K/UL (ref 0.8–3.5)
LYMPHOCYTES NFR BLD: 22.9 % (ref 12–49)
MCH RBC QN AUTO: 29.9 PG (ref 26–34)
MCHC RBC AUTO-ENTMCNC: 33.7 G/DL (ref 30–36.5)
MCV RBC AUTO: 88.7 FL (ref 80–99)
MONOCYTES # BLD: 0.38 K/UL (ref 0–1)
MONOCYTES NFR BLD: 6.7 % (ref 5–13)
NEUTS SEG # BLD: 3.88 K/UL (ref 1.8–8)
NEUTS SEG NFR BLD: 67.8 % (ref 32–75)
NRBC # BLD: 0 K/UL (ref 0–0.01)
NRBC BLD-RTO: 0 PER 100 WBC
PLATELET # BLD AUTO: 263 K/UL (ref 150–400)
PMV BLD AUTO: 11.8 FL (ref 8.9–12.9)
RBC # BLD AUTO: 4.85 M/UL (ref 3.8–5.2)
WBC # BLD AUTO: 5.7 K/UL (ref 3.6–11)

## 2025-06-26 ENCOUNTER — TELEPHONE (OUTPATIENT)
Age: 50
End: 2025-06-26

## 2025-06-26 RX ORDER — BENZONATATE 200 MG/1
200 CAPSULE ORAL 3 TIMES DAILY PRN
Qty: 30 CAPSULE | Refills: 0 | Status: SHIPPED | OUTPATIENT
Start: 2025-06-26 | End: 2025-07-06

## (undated) DEVICE — Z DISCONTINUED PER MEDLINE LINE GAS SAMPLING O2/CO2 LNG AD 13 FT NSL W/ TBNG FILTERLINE

## (undated) DEVICE — SOLIDIFIER MEDC 1200ML -- CONVERT TO 356117

## (undated) DEVICE — BASIN EMSIS 16OZ GRAPHITE PLAS KID SHP MOLD GRAD FOR ORAL

## (undated) DEVICE — 1200 GUARD II KIT W/5MM TUBE W/O VAC TUBE: Brand: GUARDIAN

## (undated) DEVICE — SET ADMIN 16ML TBNG L100IN 2 Y INJ SITE IV PIGGY BK DISP

## (undated) DEVICE — CATH IV AUTOGRD BC PNK 20GA 25 -- INSYTE

## (undated) DEVICE — TOWEL 4 PLY TISS 19X30 SUE WHT

## (undated) DEVICE — Device: Brand: MEDEX

## (undated) DEVICE — Device

## (undated) DEVICE — KENDALL RADIOLUCENT FOAM MONITORING ELECTRODE RECTANGULAR SHAPE: Brand: KENDALL

## (undated) DEVICE — (D)SYR 10ML 1/5ML GRAD NSAF -- PKGING CHANGE USE ITEM 338027

## (undated) DEVICE — SYR 3ML LL TIP 1/10ML GRAD --

## (undated) DEVICE — NEEDLE HYPO 18GA L1.5IN PNK S STL HUB POLYPR SHLD REG BVL

## (undated) DEVICE — NEONATAL-ADULT SPO2 SENSOR: Brand: NELLCOR